# Patient Record
Sex: MALE | Race: OTHER | HISPANIC OR LATINO | Employment: OTHER | URBAN - METROPOLITAN AREA
[De-identification: names, ages, dates, MRNs, and addresses within clinical notes are randomized per-mention and may not be internally consistent; named-entity substitution may affect disease eponyms.]

---

## 2017-11-26 ENCOUNTER — HOSPITAL ENCOUNTER (EMERGENCY)
Facility: HOSPITAL | Age: 57
Discharge: HOME | End: 2017-11-26
Attending: EMERGENCY MEDICINE | Admitting: EMERGENCY MEDICINE
Payer: COMMERCIAL

## 2017-11-26 ENCOUNTER — APPOINTMENT (EMERGENCY)
Dept: RADIOLOGY | Facility: HOSPITAL | Age: 57
End: 2017-11-26
Payer: COMMERCIAL

## 2017-11-26 VITALS
RESPIRATION RATE: 14 BRPM | WEIGHT: 145 LBS | TEMPERATURE: 97.8 F | OXYGEN SATURATION: 98 % | HEART RATE: 58 BPM | DIASTOLIC BLOOD PRESSURE: 61 MMHG | SYSTOLIC BLOOD PRESSURE: 116 MMHG

## 2017-11-26 DIAGNOSIS — R07.9 CHEST PAIN: Primary | ICD-10-CM

## 2017-11-26 LAB
ANION GAP SERPL CALCULATED.3IONS-SCNC: 11 MMOL/L (ref 4–13)
BASOPHILS # BLD AUTO: 0.04 THOUSANDS/ΜL (ref 0–0.1)
BASOPHILS NFR BLD AUTO: 1 % (ref 0–1)
BUN SERPL-MCNC: 13 MG/DL (ref 5–25)
CALCIUM SERPL-MCNC: 8.8 MG/DL (ref 8.3–10.1)
CHLORIDE SERPL-SCNC: 98 MMOL/L (ref 100–108)
CO2 SERPL-SCNC: 25 MMOL/L (ref 21–32)
CREAT SERPL-MCNC: 0.99 MG/DL (ref 0.6–1.3)
EOSINOPHIL # BLD AUTO: 0.19 THOUSAND/ΜL (ref 0–0.61)
EOSINOPHIL NFR BLD AUTO: 4 % (ref 0–6)
ERYTHROCYTE [DISTWIDTH] IN BLOOD BY AUTOMATED COUNT: 12.9 % (ref 11.6–15.1)
GFR SERPL CREATININE-BSD FRML MDRD: 83 ML/MIN/1.73SQ M
GLUCOSE SERPL-MCNC: 104 MG/DL (ref 65–140)
HCT VFR BLD AUTO: 40.1 % (ref 36.5–49.3)
HGB BLD-MCNC: 14.1 G/DL (ref 12–17)
LYMPHOCYTES # BLD AUTO: 1.68 THOUSANDS/ΜL (ref 0.6–4.47)
LYMPHOCYTES NFR BLD AUTO: 32 % (ref 14–44)
MCH RBC QN AUTO: 30.7 PG (ref 26.8–34.3)
MCHC RBC AUTO-ENTMCNC: 35.2 G/DL (ref 31.4–37.4)
MCV RBC AUTO: 87 FL (ref 82–98)
MONOCYTES # BLD AUTO: 0.65 THOUSAND/ΜL (ref 0.17–1.22)
MONOCYTES NFR BLD AUTO: 12 % (ref 4–12)
NEUTROPHILS # BLD AUTO: 2.77 THOUSANDS/ΜL (ref 1.85–7.62)
NEUTS SEG NFR BLD AUTO: 51 % (ref 43–75)
NRBC BLD AUTO-RTO: 0 /100 WBCS
PLATELET # BLD AUTO: 258 THOUSANDS/UL (ref 149–390)
PMV BLD AUTO: 9 FL (ref 8.9–12.7)
POTASSIUM SERPL-SCNC: 3.8 MMOL/L (ref 3.5–5.3)
RBC # BLD AUTO: 4.6 MILLION/UL (ref 3.88–5.62)
SODIUM SERPL-SCNC: 134 MMOL/L (ref 136–145)
SPECIMEN SOURCE: NORMAL
TROPONIN I BLD-MCNC: 0.01 NG/ML (ref 0–0.08)
WBC # BLD AUTO: 5.33 THOUSAND/UL (ref 4.31–10.16)

## 2017-11-26 PROCEDURE — 36415 COLL VENOUS BLD VENIPUNCTURE: CPT | Performed by: EMERGENCY MEDICINE

## 2017-11-26 PROCEDURE — 80048 BASIC METABOLIC PNL TOTAL CA: CPT | Performed by: EMERGENCY MEDICINE

## 2017-11-26 PROCEDURE — 84484 ASSAY OF TROPONIN QUANT: CPT

## 2017-11-26 PROCEDURE — 93005 ELECTROCARDIOGRAM TRACING: CPT | Performed by: EMERGENCY MEDICINE

## 2017-11-26 PROCEDURE — 99285 EMERGENCY DEPT VISIT HI MDM: CPT

## 2017-11-26 PROCEDURE — 71020 HB CHEST X-RAY 2VW FRONTAL&LATL: CPT

## 2017-11-26 PROCEDURE — 85025 COMPLETE CBC W/AUTO DIFF WBC: CPT | Performed by: EMERGENCY MEDICINE

## 2017-11-26 RX ORDER — OMEPRAZOLE 20 MG/1
20 CAPSULE, DELAYED RELEASE ORAL DAILY
COMMUNITY

## 2017-11-27 LAB
ATRIAL RATE: 67 BPM
P AXIS: 42 DEGREES
PR INTERVAL: 124 MS
QRS AXIS: 74 DEGREES
QRSD INTERVAL: 114 MS
QT INTERVAL: 384 MS
QTC INTERVAL: 405 MS
T WAVE AXIS: 61 DEGREES
VENTRICULAR RATE: 67 BPM

## 2017-11-27 NOTE — DISCHARGE INSTRUCTIONS

## 2017-11-27 NOTE — ED PROVIDER NOTES
History  Chief Complaint   Patient presents with    Chest Pain     Pt reports chest pressure and b/l arm pain, reports ongoing but today got woprse, reports has been following up with PCP, and has stress test scheduled in 2 weeks    Arm Pain     Having intermittent CP for the past two months  PCP aware and has stress test ordered  Constant since last night  Has stress test in 2 weeks for this same complaint, but doesn't want to wait  Pt also notes he goes to the gym, but does not get any CP while there  History provided by:  Patient   used: No    Chest Pain   Pain location:  L chest and R chest  Pain radiates to:  L arm and R arm  Duration:  2 months  Timing:  Intermittent  Chronicity:  New  Associated symptoms: nausea    Associated symptoms: no abdominal pain, no back pain, no cough, no diaphoresis, no dizziness, no fever, no numbness, no palpitations, no shortness of breath, not vomiting and no weakness    Risk factors: no coronary artery disease, no diabetes mellitus, no high cholesterol, no hypertension and no smoking (Quit 30 years ago)    Arm Pain   Associated symptoms: chest pain and nausea    Associated symptoms: no abdominal pain, no cough, no fever, no shortness of breath and no vomiting        Prior to Admission Medications   Prescriptions Last Dose Informant Patient Reported? Taking?   omeprazole (PriLOSEC) 20 mg delayed release capsule   Yes Yes   Sig: Take 20 mg by mouth daily      Facility-Administered Medications: None       History reviewed  No pertinent past medical history  History reviewed  No pertinent surgical history  History reviewed  No pertinent family history  I have reviewed and agree with the history as documented  Social History   Substance Use Topics    Smoking status: Former Smoker    Smokeless tobacco: Never Used    Alcohol use No        Review of Systems   Constitutional: Negative for chills, diaphoresis and fever     Respiratory: Negative for cough, chest tightness and shortness of breath  Cardiovascular: Positive for chest pain  Negative for palpitations and leg swelling  Gastrointestinal: Positive for nausea  Negative for abdominal pain and vomiting  Musculoskeletal: Negative for back pain and neck pain  Skin: Negative for pallor  Neurological: Negative for dizziness, syncope, facial asymmetry, speech difficulty, weakness, light-headedness and numbness  All other systems reviewed and are negative  Physical Exam  ED Triage Vitals [11/26/17 1936]   Temperature Pulse Respirations Blood Pressure SpO2   97 8 °F (36 6 °C) 69 16 132/72 98 %      Temp Source Heart Rate Source Patient Position - Orthostatic VS BP Location FiO2 (%)   Temporal Monitor Sitting Right arm --      Pain Score       6           Orthostatic Vital Signs  Vitals:    11/26/17 1936 11/26/17 2100 11/26/17 2115   BP: 132/72  116/61   Pulse: 69 58    Patient Position - Orthostatic VS: Sitting         Physical Exam   Constitutional: He is oriented to person, place, and time  He appears well-developed and well-nourished  Non-toxic appearance  He does not have a sickly appearance  He does not appear ill  No distress  Laughing and joking around during H&P   HENT:   Head: Normocephalic and atraumatic  Mouth/Throat: Oropharynx is clear and moist    Eyes: EOM are normal  Pupils are equal, round, and reactive to light  Neck: Normal range of motion  Neck supple  No JVD present  Cardiovascular: Normal rate, regular rhythm, S1 normal, S2 normal, normal heart sounds, intact distal pulses and normal pulses  Exam reveals no gallop and no friction rub  No murmur heard  Pulmonary/Chest: Effort normal and breath sounds normal  No respiratory distress  He has no wheezes  He has no rales  He exhibits no tenderness  Abdominal: Soft  Bowel sounds are normal  He exhibits no distension  There is no tenderness  There is no rebound and no guarding     Neurological: He is alert and oriented to person, place, and time  He has normal strength  No cranial nerve deficit or sensory deficit  Skin: Skin is warm and dry  No rash noted  He is not diaphoretic  No pallor  Nursing note and vitals reviewed  ED Medications  Medications - No data to display    Diagnostic Studies  Results Reviewed     Procedure Component Value Units Date/Time    POCT troponin [[id number]]  (Normal) Collected:  11/26/17 2004    Lab Status:  Final result Updated:  11/26/17 2017     POC Troponin I 0 01 ng/ml      Specimen Type VENOUS    Narrative:         [employer] i-Stat handheld analyzer 99% cutoff is > 0 08ng/mL in [facility] Emergency Departments    o cTnI 99% cutoff is useful only when applied to patients in the clinical setting of myocardial ischemia  o cTnI 99% cutoff should be interpreted in the context of clinical history, ECG findings and possibly cardiac imaging to establish correct diagnosis  o cTnI 99% cutoff may be suggestive but clearly not indicative of a coronary event without the clinical setting of myocardial ischemia  Basic metabolic panel [[id number]]  (Abnormal) Collected:  11/26/17 1956    Lab Status:  Final result Specimen:  Blood from Arm, Right Updated:  11/26/17 2016     Sodium 134 (L) mmol/L      Potassium 3 8 mmol/L      Chloride 98 (L) mmol/L      CO2 25 mmol/L      Anion Gap 11 mmol/L      BUN 13 mg/dL      Creatinine 0 99 mg/dL      Glucose 104 mg/dL      Calcium 8 8 mg/dL      eGFR 83 ml/min/1 73sq m     Narrative:         National Kidney Disease Education Program recommendations are as follows:  GFR calculation is accurate only with a steady state creatinine  Chronic Kidney disease less than 60 ml/min/1 73 sq  meters  Kidney failure less than 15 ml/min/1 73 sq  meters      CBC and differential [[id number]]  (Normal) Collected:  11/26/17 1956    Lab Status:  Final result Specimen:  Blood from Arm, Right Updated:  11/26/17 2005     WBC 5 33 Thousand/uL      RBC 4 60 Million/uL Hemoglobin 14 1 g/dL      Hematocrit 40 1 %      MCV 87 fL      MCH 30 7 pg      MCHC 35 2 g/dL      RDW 12 9 %      MPV 9 0 fL      Platelets 294 Thousands/uL      nRBC 0 /100 WBCs      Neutrophils Relative 51 %      Lymphocytes Relative 32 %      Monocytes Relative 12 %      Eosinophils Relative 4 %      Basophils Relative 1 %      Neutrophils Absolute 2 77 Thousands/µL      Lymphocytes Absolute 1 68 Thousands/µL      Monocytes Absolute 0 65 Thousand/µL      Eosinophils Absolute 0 19 Thousand/µL      Basophils Absolute 0 04 Thousands/µL                  XR chest 2 views   ED Interpretation by Estephanie Amaya 24, DO (11/26 2005)   No acute abnormalities                 Procedures  ECG 12 Lead Documentation  Date/Time: 11/26/2017 7:53 PM  Performed by: Anthony Terry by: Frank Fang     Indications / Diagnosis:  Chest pain  ECG reviewed by me, the ED Provider: yes    Patient location:  Bedside  Rate:     ECG rate:  67    ECG rate assessment: normal    Rhythm:     Rhythm: sinus rhythm    Ectopy:     Ectopy: PAC    QRS:     QRS axis:  Normal    QRS intervals:  Normal  ST segments:     ST segments:  Normal  T waves:     T waves: normal             Phone Contacts  ED Phone Contact    ED Course  ED Course as of Nov 26 2120   Sun Nov 26, 2017 2044 Discussed results with pt  Instructed him to f/u with PCP and have stress test as scheduled  Pt agrees  MDM  Number of Diagnoses or Management Options  Chest pain:   Diagnosis management comments: Chest pain - Will check CBC to r/o anemia, BMP to r/o lyte abnormality, troponin to assess for NSTEMI, EKG to r/o STEMI/ischemic changes, CXR to r/o PTX/PNA/pulmonary edema/effusion         Amount and/or Complexity of Data Reviewed  Clinical lab tests: reviewed and ordered  Tests in the radiology section of CPT®: ordered and reviewed  Tests in the medicine section of CPT®: ordered and reviewed      CritCare Time    Disposition  Final diagnoses:   Chest pain     Time reflects when diagnosis was documented in both MDM as applicable and the Disposition within this note     Time User Action Codes Description Comment    11/26/2017  8:37 PM Jennie Green 48 [R07 9] Chest pain       ED Disposition     ED Disposition Condition Comment    Discharge  Charlette Merlin discharge to home/self care  Condition at discharge: Good        Follow-up Information     Follow up With Specialties Details Why [address]  Schedule an appointment as soon as possible for a visit For follow up [phone] [phone]          Discharge Medication List as of 11/26/2017  8:38 PM      CONTINUE these medications which have NOT CHANGED    Details   omeprazole (PriLOSEC) 20 mg delayed release capsule Take 20 mg by mouth daily, Historical Med           No discharge procedures on file      ED Provider  Electronically Signed by           Estephanie Amaya 24, DO  11/26/17 0745

## 2018-07-23 ENCOUNTER — HOSPITAL ENCOUNTER (EMERGENCY)
Facility: HOSPITAL | Age: 58
Discharge: HOME | End: 2018-07-24
Attending: EMERGENCY MEDICINE | Admitting: EMERGENCY MEDICINE
Payer: COMMERCIAL

## 2018-07-23 DIAGNOSIS — N50.812 PAIN IN LEFT TESTICLE: Primary | ICD-10-CM

## 2018-07-23 DIAGNOSIS — M54.10 RADICULOPATHY: ICD-10-CM

## 2018-07-23 LAB
BILIRUB UR QL STRIP: NEGATIVE
CLARITY UR: CLEAR
COLOR UR: YELLOW
GLUCOSE UR STRIP-MCNC: NEGATIVE MG/DL
HGB UR QL STRIP.AUTO: NEGATIVE
KETONES UR STRIP-MCNC: NEGATIVE MG/DL
LEUKOCYTE ESTERASE UR QL STRIP: NEGATIVE
NITRITE UR QL STRIP: NEGATIVE
PH UR STRIP.AUTO: 5.5 [PH] (ref 4.5–8)
PROT UR STRIP-MCNC: NEGATIVE MG/DL
SP GR UR STRIP.AUTO: 1.01 (ref 1–1.03)
UROBILINOGEN UR QL STRIP.AUTO: 0.2 E.U./DL

## 2018-07-23 PROCEDURE — 87491 CHLMYD TRACH DNA AMP PROBE: CPT | Performed by: PHYSICIAN ASSISTANT

## 2018-07-23 PROCEDURE — 81003 URINALYSIS AUTO W/O SCOPE: CPT | Performed by: PHYSICIAN ASSISTANT

## 2018-07-23 PROCEDURE — 87591 N.GONORRHOEAE DNA AMP PROB: CPT | Performed by: PHYSICIAN ASSISTANT

## 2018-07-24 ENCOUNTER — APPOINTMENT (EMERGENCY)
Dept: ULTRASOUND IMAGING | Facility: HOSPITAL | Age: 58
End: 2018-07-24
Payer: COMMERCIAL

## 2018-07-24 VITALS
RESPIRATION RATE: 16 BRPM | HEART RATE: 54 BPM | SYSTOLIC BLOOD PRESSURE: 135 MMHG | OXYGEN SATURATION: 99 % | DIASTOLIC BLOOD PRESSURE: 78 MMHG | TEMPERATURE: 98.1 F

## 2018-07-24 PROCEDURE — 76870 US EXAM SCROTUM: CPT

## 2018-07-24 PROCEDURE — 99284 EMERGENCY DEPT VISIT MOD MDM: CPT

## 2018-07-24 RX ORDER — PREDNISONE 20 MG/1
20 TABLET ORAL 3 TIMES DAILY
Qty: 12 TABLET | Refills: 0 | Status: SHIPPED | OUTPATIENT
Start: 2018-07-24 | End: 2018-07-28

## 2018-07-24 NOTE — ED PROVIDER NOTES
History  Chief Complaint   Patient presents with    Testicle Pain     left testicle pain, reports history of hernia, also has b/l buttock pain, pain radiates down left leg and radiates to left flank  60 yo M presenting with multiple complaints  Pt reports lower left sided back pain radiating down posterior left leg x 1 month  Pt reports pain with bending and twisting  He has not tried anything at home for his symptoms  Pt also complains of L groin pain and states his L testicle is swollen  Pt reports previous repair of L inguinal hernia and this pain 'feels the same'  Pt denies any dysuria, hematuria, purulent discharge from penis, rashes or lesions of the penis  He denies any abdominal pain, n/v/d  Prior to Admission Medications   Prescriptions Last Dose Informant Patient Reported? Taking?   omeprazole (PriLOSEC) 20 mg delayed release capsule   Yes Yes   Sig: Take 20 mg by mouth daily      Facility-Administered Medications: None       Past Medical History:   Diagnosis Date    Prediabetes        Past Surgical History:   Procedure Laterality Date    APPENDECTOMY         History reviewed  No pertinent family history  I have reviewed and agree with the history as documented  Social History   Substance Use Topics    Smoking status: Former Smoker    Smokeless tobacco: Never Used    Alcohol use No        Review of Systems   All other systems reviewed and are negative  Physical Exam  Physical Exam   Constitutional: He is oriented to person, place, and time  He appears well-developed and well-nourished  No distress  HENT:   Head: Normocephalic and atraumatic  Eyes: Conjunctivae are normal    EOM grossly intact   Neck: Normal range of motion  Neck supple  No JVD present  Cardiovascular: Normal rate  Pulmonary/Chest: Effort normal    Abdominal: Soft  Genitourinary: Penis normal  No penile tenderness     Genitourinary Comments: Tenderness to L testicle, slight erythema, no swelling, vertical lie, no discharge from the penis   Musculoskeletal:   FROM, steady gait, cap refill brisk, strength and sensation grossly intact throughout   Neurological: He is alert and oriented to person, place, and time  Skin: Skin is warm and dry  Capillary refill takes less than 2 seconds  Psychiatric: He has a normal mood and affect  His behavior is normal    Nursing note and vitals reviewed  Vital Signs  ED Triage Vitals [07/23/18 2242]   Temperature Pulse Respirations Blood Pressure SpO2   98 1 °F (36 7 °C) 62 16 169/85 100 %      Temp Source Heart Rate Source Patient Position - Orthostatic VS BP Location FiO2 (%)   Temporal Monitor Sitting Right arm --      Pain Score       8           Vitals:    07/23/18 2242 07/24/18 0108   BP: 169/85 135/78   Pulse: 62 (!) 54   Patient Position - Orthostatic VS: Sitting Lying       Visual Acuity      ED Medications  Medications - No data to display    Diagnostic Studies  Results Reviewed     Procedure Component Value Units Date/Time    Chlamydia/GC amplified DNA by PCR [[id number]] Collected:  07/23/18 2344    Lab Status: In process Specimen:  Urine from Urine, Other Updated:  07/23/18 2346    UA w Reflex to Microscopic w Reflex to Culture [[id number]] Collected:  07/23/18 2327    Lab Status:  Final result Specimen:  Urine from Urine, Other Updated:  07/23/18 2334     Color, UA Yellow     Clarity, UA Clear     Specific Gravity, UA 1 015     pH, UA 5 5     Leukocytes, UA Negative     Nitrite, UA Negative     Protein, UA Negative mg/dl      Glucose, UA Negative mg/dl      Ketones, UA Negative mg/dl      Urobilinogen, UA 0 2 E U /dl      Bilirubin, UA Negative     Blood, UA Negative                 US scrotum and testicles   Final Result by Coral Carson MD (07/24 0053)          1  Small bilateral hydroceles  2   No evidence of testicular torsion or mass  No evidence of epididymitis or orchitis        Workstation performed: [id number] Procedures  Procedures       Phone Contacts  ED Phone Contact    ED Course  ED Course as of Jul 24 0111   Mon Jul 23, 2018   2358 Pt requesting 'blood work for my liver and prostate', pt does not have any RUQ pain, long conversation with pt regarding he needs to f/u with pcp and urology for psa and routine blood work, pt stating he thinks his urologist is 'junk' and 'i think hes pyle', spoke with pt regarding we will get u/s at this time to r/o torsion but will need to f/u with pcp regarding liver enzymes and psa testing    Tue Jul 24, 2018   0008 Pt reporting 'ill just go to another hospital tomorrow for blood word'  Conversation with pt again about the importance of f/u with PCP    0009 Will await u/s scrotum    0102 Pt with hydrocele visualized on u/s, no evidence of torsion or epididymitis, will have another conversation with pt regarding the importance of f/u with pcp and urology for continued problem and d/c                                MDM  Number of Diagnoses or Management Options  Diagnosis management comments: Pt denies any RUQ pain, n/v/d, fevers chills or sweats  U/s showed no torsion or epidiymitis, will instruct pt to f/u with urology for continued testicular pain, pt continues to deny any dysuria, hematuria, urgency or frequency  Pt will need to f/u with pcp for routine bloodwork, long conversation with pt that we are here to r/o emergencies and do not run routine bloodwork as we do not have the f/u as his PCP will  Pt c/o posterior leg pain, likely radiculopathy, explained to pt will be starting on short course of steroids  All labs and imaging discussed with patient, strict return to ED precautions discussed  Pt verbalizes understanding and agrees with plan   Pt is stable for discharge      CritCare Time    Disposition  Final diagnoses:   Pain in left testicle   Radiculopathy     Time reflects when diagnosis was documented in both MDM as applicable and the Disposition within this note     Time User Action Codes Description Comment    7/24/2018  1:09 AM Ponce JIMÉNEZ Add [N50 812] Pain in left testicle     7/24/2018  1:10 AM Ponce JIMÉNEZ Add [M54 10] Radiculopathy       ED Disposition     ED Disposition Condition Comment    Discharge  Danielito Crutch discharge to home/self care  Condition at discharge: Good        Follow-up Information     Follow up With Specialties Details Why [facility], [address] [place] [address],[address] [facility]  [phone]      [facility]   [facility] [address]  [address] [place] [id number] [phone]          Patient's Medications   Discharge Prescriptions    PREDNISONE 20 MG TABLET    Take 1 tablet (20 mg total) by mouth 3 (three) times a day for 4 days       Start Date: 7/24/2018 End Date: 7/28/2018       Order Dose: 20 mg       Quantity: 12 tablet    Refills: 0     No discharge procedures on file      ED Provider  Electronically Signed by           Albert Hwang PA-C  07/24/18 0111

## 2018-07-24 NOTE — DISCHARGE INSTRUCTIONS
Testicle Pain   WHAT YOU NEED TO KNOW:   Testicle pain may start in your scrotum and spread to your abdomen  You may have sharp, sudden pain or dull pain that happens over time  Your testicle pain may come and go, or it may last for a long time  The cause of your pain may be unknown  Testicle pain can be caused by infection, trauma, hernia, kidney stones, or sexually transmitted infections (STIs)  You may have a painful lump in your scrotum  The lump may be caused by an enlarged vein or fluid that collects around one of your testicles  This lump also may be caused by a more serious medical condition  Part of your testicle may twist  This is a serious condition that needs treatment as soon as possible  DISCHARGE INSTRUCTIONS:   Medicines:   · Antibiotics: This medicine helps fight or prevent infection  Take your antibiotics until they are gone, even if you feel better  · Pain medicine: You may be given a prescription medicine to decrease pain  Do not wait until the pain is severe before you take this medicine  · NSAIDs:  These medicines decrease swelling, pain, and fever  NSAIDs are available without a doctor's order  Ask your healthcare provider which medicine is right for you  Ask how much to take and when to take it  Take as directed  NSAIDs can cause stomach bleeding and kidney problems if not taken correctly  · Take your medicine as directed  Contact your healthcare provider if you think your medicine is not helping or if you have side effects  Tell him or her if you are allergic to any medicine  Keep a list of the medicines, vitamins, and herbs you take  Include the amounts, and when and why you take them  Bring the list or the pill bottles to follow-up visits  Carry your medicine list with you in case of an emergency  Decrease discomfort:  With treatment, your pain may improve within 1 to 3 days   Depending on the cause of your testicle pain, your condition may take up to 4 weeks to heal   · Rest: Limit your activity until your pain decreases  Get more rest while you heal  Do not sit for long periods of time  · Cold packs:  Place cold packs on your testicles to help ease your pain  Use cold packs as directed  · Elevation:  Gently tuck a folded towel under your testicles to lift them as you sit in a chair or lie in bed  This will help ease your pain and decrease swelling  Follow up with your healthcare provider or urologist in 3 to 7 days:  Write down your questions so you remember to ask them during your visits  Sexual activity:  Avoid sexual activity until you have finished your antibiotics or until your healthcare provider tells you it is safe to have sex  Use condoms to lower your risk of STIs  Contact your healthcare provider or urologist if:   · You feel that your medicine or treatment is not working  · You feel more pain, tenderness, or swelling than before  · You have nausea or a low fever  · You have questions or concerns about your condition or care  Return to the emergency department if:   · You have sudden or severe pain in your testicles or abdomen  · You have pain in both testicles  · You are vomiting  · You have a high fever  · Your pain increases when you elevate your testicles  · Your scrotum turns blue  This could mean your testicle is not getting the blood flow it needs  © 2017 [address] Information is for End User's use only and may not be sold, redistributed or otherwise used for commercial purposes  All illustrations and images included in CareNotes® are the copyrighted property of A D A M [employer] Inc  or Brandt Shepard  The above information is an  only  It is not intended as medical advice for individual conditions or treatments  Talk to your doctor, nurse or pharmacist before following any medical regimen to see if it is safe and effective for you

## 2018-07-24 NOTE — ED NOTES
Pt transported to [address] [place] [address] [address]        Angus Johnson, RN  07/24/18 0914

## 2018-07-24 NOTE — ED NOTES
Pt states, "I came here to get blood work  The ultrasound is great and everything but I want my blood checked  Sometimes I have pain in my side so I need my liver and kidneys checked"  Marjan Monteiro made aware        Madi Echols RN  07/23/18 4284

## 2018-07-25 LAB
CHLAMYDIA DNA CVX QL NAA+PROBE: NORMAL
N GONORRHOEA DNA GENITAL QL NAA+PROBE: NORMAL

## 2020-01-22 ENCOUNTER — APPOINTMENT (EMERGENCY)
Dept: CT IMAGING | Facility: HOSPITAL | Age: 60
End: 2020-01-22
Payer: COMMERCIAL

## 2020-01-22 ENCOUNTER — HOSPITAL ENCOUNTER (EMERGENCY)
Facility: HOSPITAL | Age: 60
Discharge: HOME | End: 2020-01-23
Attending: EMERGENCY MEDICINE
Payer: COMMERCIAL

## 2020-01-22 DIAGNOSIS — R51.9 HEADACHE: Primary | ICD-10-CM

## 2020-01-22 PROCEDURE — 99284 EMERGENCY DEPT VISIT MOD MDM: CPT | Performed by: EMERGENCY MEDICINE

## 2020-01-22 PROCEDURE — 99284 EMERGENCY DEPT VISIT MOD MDM: CPT

## 2020-01-22 PROCEDURE — 70450 CT HEAD/BRAIN W/O DYE: CPT

## 2020-01-23 VITALS
HEART RATE: 61 BPM | TEMPERATURE: 97.7 F | WEIGHT: 150.13 LBS | OXYGEN SATURATION: 99 % | SYSTOLIC BLOOD PRESSURE: 148 MMHG | RESPIRATION RATE: 18 BRPM | DIASTOLIC BLOOD PRESSURE: 75 MMHG

## 2020-01-23 RX ORDER — IBUPROFEN 600 MG/1
600 TABLET ORAL ONCE
Status: COMPLETED | OUTPATIENT
Start: 2020-01-23 | End: 2020-01-23

## 2020-01-23 RX ORDER — ACETAMINOPHEN 325 MG/1
975 TABLET ORAL ONCE
Status: DISCONTINUED | OUTPATIENT
Start: 2020-01-23 | End: 2020-01-23 | Stop reason: HOSPADM

## 2020-01-23 RX ADMIN — IBUPROFEN 600 MG: 600 TABLET ORAL at 00:34

## 2020-01-23 NOTE — DISCHARGE INSTRUCTIONS
Acute Headache   DISCHARGE INSTRUCTIONS:   Return to the emergency department if:   You have severe pain  You have numbness or weakness on one side of your face or body  You have a headache that occurs after a blow to the head, a fall, or other trauma  You have a headache, are forgetful or confused, or have trouble speaking  You have a headache, stiff neck, and a fever  Contact your healthcare provider if:   You have a constant headache and are vomiting  You have a headache each day that does not get better, even after treatment  You have changes in your headaches, or new symptoms that occur when you have a headache  You have questions or concerns about your condition or care

## 2020-01-23 NOTE — ED PROVIDER NOTES
Emergency Department Note- Marianne Chi 62 y o  male MRN: [id number]    Unit/Bed#: ED 22 Encounter: [id number]        History of Present Illness     41-year-old male presents today for evaluation of a occipital headache  He says about 2 months ago he began having some pain in the back part of his head, which would somewhat wax and wane, but not really triggered by anything or made worse with anything and better with nothing  No history of trauma, no fever, no chills, no difficulty speaking, concentrating, or thinking  No difficulty moving the arms or legs  No one else sick with similar headaches, no recent head or neck infections or head or neck surgeries  He said he has not taken medication for the headache, and over the last several days the headache has gotten a little bit worse  He called his primary care physician earlier today, and was advised to go to the ED for further workup  REVIEW OF SYSTEMS     Constitutional:  No recent weight  gains or losses   Eyes:  No visual changes   ENT:  No tinnitus or hearing changes   Cardiac: No chest pain or palpitations   Respiratory:  No cough or shortness of breath   Abdominal:  No nausea or vomiting   Urinary: No dysuria or hematuria   Hematologic: No easy bruising or bleeding   Skin: No rash   Musculoskeletal: No aches or pains   Neurologic: As per HPI   Psychiatric: No mood changes      Historical Information   Past Medical History:   Diagnosis Date    Prediabetes      Past Surgical History:   Procedure Laterality Date    APPENDECTOMY       Social History   Social History     Substance and Sexual Activity   Alcohol Use Yes    Comment: social     Social History     Substance and Sexual Activity   Drug Use No     Social History     Tobacco Use   Smoking Status Former Smoker   Smokeless Tobacco Never Used     Family History: History reviewed  No pertinent family history  MEDICATIONS:  No current facility-administered medications on file prior to encounter  Current Outpatient Medications on File Prior to Encounter   Medication Sig Dispense Refill    omeprazole (PriLOSEC) 20 mg delayed release capsule Take 20 mg by mouth daily       ALLERGIES:  No Known Allergies    Vitals: Blood pressure 148/75, pulse 61, temperature 97 7 °F (36 5 °C), temperature source Temporal, resp  rate 18, weight 68 1 kg (150 lb 2 1 oz), SpO2 99 %  PHYSICAL EXAM    General:  Patient is well-appearing  Head:  Atraumatic, no redness or swelling  Eyes:  Conjunctiva pink, Extraocular muscle intact, PERRL, no temporal artery tenderness on either side  ENT:  Mucous membranes are moist  Neck:  Supple, no warmth or redness, no stiffness, no muscular hypertonicity  Cardiac:  S1-S2, without murmurs  Lungs:  Clear to auscultation bilaterally  Abdomen:  Soft, nontender, normal bowel sounds, no CVA tenderness, no tympany, no rigidity, no guarding  Extremities:  Normal range of motion  Neurologic:  Awake, fluent speech, normal comprehension  AAOx3  Cranial nerves 2-12 are intact, strength is 5/5 in the bilateral upper & lower extremities, no slurred speech, no facial droop, no deficit on finger-to-nose testing, no pronator drift  Sensation to light touch is equal and symmetric throughout the whole body  Skin:  Pink warm and dry, no rash  Psychiatric:  Alert, pleasant, cooperative            Labs Reviewed - No data to display    Medications   acetaminophen (TYLENOL) tablet 975 mg (975 mg Oral Not Given 1/23/20 0028)   ibuprofen (MOTRIN) tablet 600 mg (600 mg Oral Given 1/23/20 0034)       CT head wo contrast   Final Result      No acute intracranial abnormality  Workstation performed: [id number]             Vitals:    01/22/20 2255 01/23/20 0035   BP: 140/63 148/75   TempSrc: Temporal    Pulse: 67 61   Resp: 20 18   Patient Position - Orthostatic VS: Sitting Sitting   Temp: 97 7 °F (36 5 °C)             Assessment/Plan     ED Medical Decision Making:     On reassessment the patient was feeling better, no change the above findings  The cause the patient's symptoms is unclear but unlikely to represent an acute emergency  I do not believe that the patient has meningitis, subarachnoid hemorrhage, carbon monoxide exposure headache, or cavernous sinus thrombosis  Supportive care, importance of follow-up and return precautions were discussed with the patient  Patient expressed understanding  Time reflects when diagnosis was documented in both MDM as applicable and the Disposition within this note     Time User Action Codes Description Comment    1/23/2020 12:38 AM Falguni Marin Add [R51] Headache       ED Disposition     ED Disposition Condition Date/Time Comment    Discharge Stable u Jan 23, 2020 12:38 AM Derick  discharge to home/self care              Follow-up Information     Follow up With Specialties Details Why [id number] DO [facility] Schedule an appointment as soon as possible for a visit in 4 days  [address] [place] [address]  [phone]            Discharge Medication List as of 1/23/2020 12:38 AM               King Edelmira DO  01/23/20 0045

## 2020-01-23 NOTE — ED NOTES
Patient transported to [address] [place] [address]  , [facility]  01/22/20 5554

## 2024-06-05 ENCOUNTER — HOSPITAL ENCOUNTER (EMERGENCY)
Facility: HOSPITAL | Age: 64
Discharge: HOME | End: 2024-06-05
Attending: EMERGENCY MEDICINE
Payer: COMMERCIAL

## 2024-06-05 ENCOUNTER — APPOINTMENT (EMERGENCY)
Dept: RADIOLOGY | Facility: HOSPITAL | Age: 64
End: 2024-06-05
Payer: COMMERCIAL

## 2024-06-05 VITALS
TEMPERATURE: 98.9 F | HEART RATE: 88 BPM | WEIGHT: 148 LBS | DIASTOLIC BLOOD PRESSURE: 97 MMHG | SYSTOLIC BLOOD PRESSURE: 178 MMHG | RESPIRATION RATE: 22 BRPM | OXYGEN SATURATION: 98 %
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
## 2024-06-05 PROCEDURE — 99284 EMERGENCY DEPT VISIT MOD MDM: CPT | Performed by: EMERGENCY MEDICINE

## 2024-06-05 PROCEDURE — 73080 X-RAY EXAM OF ELBOW: CPT

## 2024-06-05 PROCEDURE — 99284 EMERGENCY DEPT VISIT MOD MDM: CPT

## 2024-06-05 PROCEDURE — 71046 X-RAY EXAM CHEST 2 VIEWS: CPT

## 2024-06-05 NOTE — ED NOTES
Awake, alert, O x 4.  Pt ambulated to room with steady gait.  Speech clear and appropriate.  HAYES WNL.  Resps easy and regular.  BBS CTA.  Reports pain at left elbow and left upper back.  No deformity, swelling, or visible injury.  Denies head strike, LOC, or neck pain.     Tim Henley RN  06/05/24 4080
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
## 2024-06-17 ENCOUNTER — HOSPITAL ENCOUNTER (OUTPATIENT)
Dept: RADIOLOGY | Facility: HOSPITAL | Age: 64
Discharge: HOME | End: 2024-06-17
Payer: COMMERCIAL

## 2024-06-17 DIAGNOSIS — T14.90XA TRAUMATIC DEFORMITY: ICD-10-CM

## 2024-06-17 PROCEDURE — 70250 X-RAY EXAM OF SKULL: CPT

## 2024-07-06 ENCOUNTER — HOSPITAL ENCOUNTER (OUTPATIENT)
Facility: MEDICAL CENTER | Age: 64
Discharge: HOME | End: 2024-07-06
Payer: COMMERCIAL

## 2024-07-06 DIAGNOSIS — T14.90XA INJURY: ICD-10-CM

## 2024-07-06 PROCEDURE — 72141 MRI NECK SPINE W/O DYE: CPT

## 2024-12-30 ENCOUNTER — APPOINTMENT (OUTPATIENT)
Dept: LAB | Facility: HOSPITAL | Age: 64
End: 2024-12-30
Payer: COMMERCIAL

## 2024-12-30 DIAGNOSIS — N40.1 ENLARGED PROSTATE WITH URINARY OBSTRUCTION: Primary | ICD-10-CM

## 2024-12-30 DIAGNOSIS — N13.8 ENLARGED PROSTATE WITH URINARY OBSTRUCTION: Primary | ICD-10-CM

## 2024-12-30 LAB
CHOLEST SERPL-MCNC: 150 MG/DL (ref ?–200)
HDLC SERPL-MCNC: 52 MG/DL
LDLC SERPL CALC-MCNC: 85 MG/DL (ref 0–100)
NONHDLC SERPL-MCNC: 98 MG/DL
PSA FREE MFR SERPL: 16.38 %
PSA FREE SERPL-MCNC: 0.09 NG/ML
PSA SERPL-MCNC: 0.57 NG/ML (ref 0–4)
TRIGL SERPL-MCNC: 63 MG/DL (ref ?–150)

## 2024-12-30 PROCEDURE — 80061 LIPID PANEL: CPT

## 2024-12-30 PROCEDURE — 36415 COLL VENOUS BLD VENIPUNCTURE: CPT

## 2024-12-30 PROCEDURE — 84153 ASSAY OF PSA TOTAL: CPT

## 2024-12-30 PROCEDURE — 84403 ASSAY OF TOTAL TESTOSTERONE: CPT

## 2024-12-30 PROCEDURE — 84154 ASSAY OF PSA FREE: CPT

## 2024-12-30 PROCEDURE — 84402 ASSAY OF FREE TESTOSTERONE: CPT

## 2025-01-03 LAB
TESTOST FREE SERPL-MCNC: 11 PG/ML (ref 6.6–18.1)
TESTOST SERPL-MCNC: 823 NG/DL (ref 264–916)

## 2025-03-26 ENCOUNTER — APPOINTMENT (OUTPATIENT)
Dept: LAB | Facility: HOSPITAL | Age: 65
End: 2025-03-26
Payer: COMMERCIAL

## 2025-03-26 DIAGNOSIS — E53.8 BIOTIN-(PROPIONYL-COA-CARBOXYLASE) LIGASE DEFICIENCY: ICD-10-CM

## 2025-03-26 DIAGNOSIS — R63.4 LOSS OF WEIGHT: ICD-10-CM

## 2025-03-26 LAB
ALBUMIN SERPL BCG-MCNC: 4.6 G/DL (ref 3.5–5)
ALP SERPL-CCNC: 76 U/L (ref 34–104)
ALT SERPL W P-5'-P-CCNC: 25 U/L (ref 7–52)
ANION GAP SERPL CALCULATED.3IONS-SCNC: 3 MMOL/L (ref 4–13)
AST SERPL W P-5'-P-CCNC: 22 U/L (ref 13–39)
BASOPHILS # BLD AUTO: 0.05 THOUSANDS/ÂΜL (ref 0–0.1)
BASOPHILS NFR BLD AUTO: 1 % (ref 0–1)
BILIRUB SERPL-MCNC: 0.9 MG/DL (ref 0.2–1)
BUN SERPL-MCNC: 9 MG/DL (ref 5–25)
CALCIUM SERPL-MCNC: 9.2 MG/DL (ref 8.4–10.2)
CHLORIDE SERPL-SCNC: 98 MMOL/L (ref 96–108)
CO2 SERPL-SCNC: 28 MMOL/L (ref 21–32)
CREAT SERPL-MCNC: 0.69 MG/DL (ref 0.6–1.3)
EOSINOPHIL # BLD AUTO: 0.16 THOUSAND/ÂΜL (ref 0–0.61)
EOSINOPHIL NFR BLD AUTO: 4 % (ref 0–6)
ERYTHROCYTE [DISTWIDTH] IN BLOOD BY AUTOMATED COUNT: 12.4 % (ref 11.6–15.1)
GFR SERPL CREATININE-BSD FRML MDRD: 98 ML/MIN/1.73SQ M
GLUCOSE P FAST SERPL-MCNC: 95 MG/DL (ref 65–99)
HCT VFR BLD AUTO: 41 % (ref 36.5–49.3)
HGB BLD-MCNC: 13.8 G/DL (ref 12–17)
IMM GRANULOCYTES # BLD AUTO: 0.01 THOUSAND/UL (ref 0–0.2)
IMM GRANULOCYTES NFR BLD AUTO: 0 % (ref 0–2)
LYMPHOCYTES # BLD AUTO: 1.52 THOUSANDS/ÂΜL (ref 0.6–4.47)
LYMPHOCYTES NFR BLD AUTO: 36 % (ref 14–44)
MCH RBC QN AUTO: 30.3 PG (ref 26.8–34.3)
MCHC RBC AUTO-ENTMCNC: 33.7 G/DL (ref 31.4–37.4)
MCV RBC AUTO: 90 FL (ref 82–98)
MONOCYTES # BLD AUTO: 0.47 THOUSAND/ÂΜL (ref 0.17–1.22)
MONOCYTES NFR BLD AUTO: 11 % (ref 4–12)
NEUTROPHILS # BLD AUTO: 2.02 THOUSANDS/ÂΜL (ref 1.85–7.62)
NEUTS SEG NFR BLD AUTO: 48 % (ref 43–75)
NRBC BLD AUTO-RTO: 0 /100 WBCS
PLATELET # BLD AUTO: 292 THOUSANDS/UL (ref 149–390)
PMV BLD AUTO: 8.8 FL (ref 8.9–12.7)
POTASSIUM SERPL-SCNC: 4.1 MMOL/L (ref 3.5–5.3)
PROT SERPL-MCNC: 7.6 G/DL (ref 6.4–8.4)
RBC # BLD AUTO: 4.55 MILLION/UL (ref 3.88–5.62)
SODIUM SERPL-SCNC: 129 MMOL/L (ref 135–147)
VIT B12 SERPL-MCNC: 213 PG/ML (ref 180–914)
WBC # BLD AUTO: 4.23 THOUSAND/UL (ref 4.31–10.16)

## 2025-03-26 PROCEDURE — 36415 COLL VENOUS BLD VENIPUNCTURE: CPT

## 2025-03-26 PROCEDURE — 85025 COMPLETE CBC W/AUTO DIFF WBC: CPT

## 2025-03-26 PROCEDURE — 80053 COMPREHEN METABOLIC PANEL: CPT

## 2025-03-26 PROCEDURE — 82607 VITAMIN B-12: CPT

## 2025-04-03 ENCOUNTER — OFFICE VISIT (OUTPATIENT)
Dept: NEUROLOGY | Facility: CLINIC | Age: 65
End: 2025-04-03
Payer: COMMERCIAL

## 2025-04-03 VITALS
TEMPERATURE: 98.2 F | HEIGHT: 66 IN | DIASTOLIC BLOOD PRESSURE: 76 MMHG | BODY MASS INDEX: 23.78 KG/M2 | WEIGHT: 148 LBS | OXYGEN SATURATION: 99 % | HEART RATE: 81 BPM | SYSTOLIC BLOOD PRESSURE: 134 MMHG

## 2025-04-03 DIAGNOSIS — G62.9 NEUROPATHY: ICD-10-CM

## 2025-04-03 DIAGNOSIS — M48.02 CERVICAL STENOSIS OF SPINAL CANAL: ICD-10-CM

## 2025-04-03 DIAGNOSIS — E53.8 VITAMIN B 12 DEFICIENCY: Primary | ICD-10-CM

## 2025-04-03 PROCEDURE — 96372 THER/PROPH/DIAG INJ SC/IM: CPT | Performed by: PSYCHIATRY & NEUROLOGY

## 2025-04-03 PROCEDURE — 99205 OFFICE O/P NEW HI 60 MIN: CPT | Performed by: PSYCHIATRY & NEUROLOGY

## 2025-04-03 RX ORDER — DOXAZOSIN 2 MG/1
2 TABLET ORAL
COMMUNITY

## 2025-04-03 RX ORDER — CYANOCOBALAMIN 1000 UG/ML
1000 INJECTION, SOLUTION INTRAMUSCULAR; SUBCUTANEOUS ONCE
Status: COMPLETED | OUTPATIENT
Start: 2025-04-03 | End: 2025-04-03

## 2025-04-03 RX ORDER — PANTOPRAZOLE SODIUM 40 MG/1
TABLET, DELAYED RELEASE ORAL
COMMUNITY

## 2025-04-03 RX ORDER — ALBUTEROL SULFATE 90 UG/1
INHALANT RESPIRATORY (INHALATION)
COMMUNITY
Start: 2025-02-12

## 2025-04-03 RX ORDER — FLUTICASONE PROPIONATE 50 MCG
SPRAY, SUSPENSION (ML) NASAL
COMMUNITY
Start: 2025-03-29

## 2025-04-03 RX ORDER — TRIAMCINOLONE ACETONIDE 1 MG/G
CREAM TOPICAL
COMMUNITY
Start: 2025-02-12

## 2025-04-03 RX ADMIN — CYANOCOBALAMIN 1000 MCG: 1000 INJECTION, SOLUTION INTRAMUSCULAR; SUBCUTANEOUS at 09:21
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
## 2025-04-17 ENCOUNTER — CONSULT (OUTPATIENT)
Dept: NEUROSURGERY | Facility: CLINIC | Age: 65
End: 2025-04-17
Payer: COMMERCIAL

## 2025-04-17 ENCOUNTER — APPOINTMENT (OUTPATIENT)
Dept: LAB | Facility: HOSPITAL | Age: 65
End: 2025-04-17
Payer: COMMERCIAL

## 2025-04-17 VITALS
HEART RATE: 79 BPM | SYSTOLIC BLOOD PRESSURE: 128 MMHG | BODY MASS INDEX: 23.67 KG/M2 | OXYGEN SATURATION: 98 % | TEMPERATURE: 97.4 F | WEIGHT: 147.3 LBS | DIASTOLIC BLOOD PRESSURE: 74 MMHG | RESPIRATION RATE: 16 BRPM | HEIGHT: 66 IN

## 2025-04-17 DIAGNOSIS — E87.1 HYPOSMOLALITY SYNDROME: ICD-10-CM

## 2025-04-17 DIAGNOSIS — M79.601 PAIN IN BOTH UPPER EXTREMITIES: ICD-10-CM

## 2025-04-17 DIAGNOSIS — M47.812 CERVICAL SPONDYLOSIS: Primary | ICD-10-CM

## 2025-04-17 DIAGNOSIS — M79.602 PAIN IN BOTH UPPER EXTREMITIES: ICD-10-CM

## 2025-04-17 DIAGNOSIS — M48.02 CERVICAL STENOSIS OF SPINAL CANAL: ICD-10-CM

## 2025-04-17 LAB
ANION GAP SERPL CALCULATED.3IONS-SCNC: 4 MMOL/L (ref 4–13)
BUN SERPL-MCNC: 12 MG/DL (ref 5–25)
CALCIUM SERPL-MCNC: 9.1 MG/DL (ref 8.4–10.2)
CHLORIDE SERPL-SCNC: 100 MMOL/L (ref 96–108)
CO2 SERPL-SCNC: 26 MMOL/L (ref 21–32)
CREAT SERPL-MCNC: 0.58 MG/DL (ref 0.6–1.3)
GFR SERPL CREATININE-BSD FRML MDRD: 106 ML/MIN/1.73SQ M
GLUCOSE P FAST SERPL-MCNC: 104 MG/DL (ref 65–99)
OSMOLALITY UR/SERPL-RTO: 281 MMOL/KG (ref 282–298)
POTASSIUM SERPL-SCNC: 4.2 MMOL/L (ref 3.5–5.3)
SODIUM SERPL-SCNC: 130 MMOL/L (ref 135–147)
TSH SERPL DL<=0.05 MIU/L-ACNC: 1.43 UIU/ML (ref 0.45–4.5)

## 2025-04-17 PROCEDURE — 83930 ASSAY OF BLOOD OSMOLALITY: CPT

## 2025-04-17 PROCEDURE — 80048 BASIC METABOLIC PNL TOTAL CA: CPT

## 2025-04-17 PROCEDURE — 99204 OFFICE O/P NEW MOD 45 MIN: CPT | Performed by: PHYSICIAN ASSISTANT

## 2025-04-17 PROCEDURE — 84443 ASSAY THYROID STIM HORMONE: CPT

## 2025-04-17 PROCEDURE — 36415 COLL VENOUS BLD VENIPUNCTURE: CPT

## 2025-04-17 RX ORDER — IBUPROFEN 800 MG/1
TABLET, FILM COATED ORAL
COMMUNITY
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
## 2025-05-07 ENCOUNTER — OFFICE VISIT (OUTPATIENT)
Dept: OBGYN CLINIC | Facility: MEDICAL CENTER | Age: 65
End: 2025-05-07
Payer: COMMERCIAL

## 2025-05-07 VITALS — BODY MASS INDEX: 23.3 KG/M2 | HEIGHT: 66 IN | WEIGHT: 145 LBS

## 2025-05-07 DIAGNOSIS — M77.12 LATERAL EPICONDYLITIS OF LEFT ELBOW: ICD-10-CM

## 2025-05-07 DIAGNOSIS — G89.29 CHRONIC ELBOW PAIN, LEFT: ICD-10-CM

## 2025-05-07 DIAGNOSIS — M75.82 TENDINITIS OF LEFT ROTATOR CUFF: ICD-10-CM

## 2025-05-07 DIAGNOSIS — M25.512 CHRONIC LEFT SHOULDER PAIN: Primary | ICD-10-CM

## 2025-05-07 DIAGNOSIS — G89.29 CHRONIC LEFT SHOULDER PAIN: Primary | ICD-10-CM

## 2025-05-07 DIAGNOSIS — M25.522 CHRONIC ELBOW PAIN, LEFT: ICD-10-CM
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
## 2025-05-07 PROCEDURE — 99204 OFFICE O/P NEW MOD 45 MIN: CPT | Performed by: EMERGENCY MEDICINE

## 2025-05-07 RX ORDER — LIDOCAINE HYDROCHLORIDE 20 MG/ML
SOLUTION OROPHARYNGEAL
COMMUNITY

## 2025-05-07 RX ORDER — METHYLPREDNISOLONE 4 MG/1
TABLET ORAL
Qty: 1 EACH | Refills: 0 | Status: SHIPPED | OUTPATIENT
Start: 2025-05-07
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
## 2025-05-09 PROBLEM — M25.522 CHRONIC ELBOW PAIN, LEFT: Status: ACTIVE | Noted: 2025-05-09

## 2025-05-09 PROBLEM — G89.29 CHRONIC LEFT SHOULDER PAIN: Status: ACTIVE | Noted: 2025-05-09

## 2025-05-09 PROBLEM — M75.82 TENDINITIS OF LEFT ROTATOR CUFF: Status: ACTIVE | Noted: 2025-05-09

## 2025-05-09 PROBLEM — M77.12 LATERAL EPICONDYLITIS OF LEFT ELBOW: Status: ACTIVE | Noted: 2025-05-09

## 2025-05-09 PROBLEM — M25.512 CHRONIC LEFT SHOULDER PAIN: Status: ACTIVE | Noted: 2025-05-09

## 2025-05-21 ENCOUNTER — HOSPITAL ENCOUNTER (OUTPATIENT)
Dept: MRI IMAGING | Facility: HOSPITAL | Age: 65
Discharge: HOME | End: 2025-05-21
Attending: EMERGENCY MEDICINE
Payer: COMMERCIAL
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
## 2025-05-21 DIAGNOSIS — M25.512 CHRONIC LEFT SHOULDER PAIN: ICD-10-CM

## 2025-05-21 DIAGNOSIS — M77.12 LATERAL EPICONDYLITIS OF LEFT ELBOW: ICD-10-CM

## 2025-05-21 DIAGNOSIS — G89.29 CHRONIC LEFT SHOULDER PAIN: ICD-10-CM

## 2025-05-21 DIAGNOSIS — M75.82 TENDINITIS OF LEFT ROTATOR CUFF: ICD-10-CM

## 2025-05-21 DIAGNOSIS — M25.522 CHRONIC ELBOW PAIN, LEFT: ICD-10-CM

## 2025-05-21 DIAGNOSIS — G89.29 CHRONIC ELBOW PAIN, LEFT: ICD-10-CM

## 2025-05-21 PROCEDURE — 73221 MRI JOINT UPR EXTREM W/O DYE: CPT

## 2025-05-28 ENCOUNTER — APPOINTMENT (EMERGENCY)
Dept: CT IMAGING | Facility: HOSPITAL | Age: 65
End: 2025-05-28
Payer: COMMERCIAL

## 2025-05-28 ENCOUNTER — OFFICE VISIT (OUTPATIENT)
Dept: OBGYN CLINIC | Facility: MEDICAL CENTER | Age: 65
End: 2025-05-28
Payer: COMMERCIAL

## 2025-05-28 ENCOUNTER — HOSPITAL ENCOUNTER (EMERGENCY)
Facility: HOSPITAL | Age: 65
Discharge: HOME | End: 2025-05-29
Attending: EMERGENCY MEDICINE | Admitting: EMERGENCY MEDICINE
Payer: COMMERCIAL

## 2025-05-28 ENCOUNTER — APPOINTMENT (EMERGENCY)
Dept: RADIOLOGY | Facility: HOSPITAL | Age: 65
End: 2025-05-28
Payer: COMMERCIAL

## 2025-05-28 VITALS — HEIGHT: 66 IN | BODY MASS INDEX: 23.78 KG/M2 | WEIGHT: 148 LBS

## 2025-05-28 VITALS
TEMPERATURE: 97.8 F | OXYGEN SATURATION: 100 % | HEART RATE: 61 BPM | DIASTOLIC BLOOD PRESSURE: 59 MMHG | SYSTOLIC BLOOD PRESSURE: 121 MMHG | RESPIRATION RATE: 18 BRPM

## 2025-05-28 DIAGNOSIS — G89.29 CHRONIC ELBOW PAIN, LEFT: ICD-10-CM

## 2025-05-28 DIAGNOSIS — G89.29 CHRONIC LEFT SHOULDER PAIN: Primary | ICD-10-CM

## 2025-05-28 DIAGNOSIS — M25.522 CHRONIC ELBOW PAIN, LEFT: ICD-10-CM

## 2025-05-28 DIAGNOSIS — M75.82 TENDINITIS OF LEFT ROTATOR CUFF: ICD-10-CM

## 2025-05-28 DIAGNOSIS — M25.512 CHRONIC LEFT SHOULDER PAIN: Primary | ICD-10-CM

## 2025-05-28 DIAGNOSIS — R00.1 SINUS BRADYCARDIA: ICD-10-CM

## 2025-05-28 DIAGNOSIS — M19.012 PRIMARY OSTEOARTHRITIS OF LEFT SHOULDER: ICD-10-CM

## 2025-05-28 DIAGNOSIS — R07.9 CHEST PAIN, UNSPECIFIED: Primary | ICD-10-CM

## 2025-05-28 DIAGNOSIS — S43.432D TEAR OF LEFT GLENOID LABRUM, SUBSEQUENT ENCOUNTER: ICD-10-CM
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
## 2025-05-28 DIAGNOSIS — E87.1 HYPONATREMIA: ICD-10-CM

## 2025-05-28 DIAGNOSIS — M77.12 LATERAL EPICONDYLITIS OF LEFT ELBOW: ICD-10-CM

## 2025-05-28 PROBLEM — S43.432A TEAR OF LEFT GLENOID LABRUM: Status: ACTIVE | Noted: 2025-05-28

## 2025-05-28 PROBLEM — S43.432A TEAR OF LEFT GLENOID LABRUM: Chronic | Status: ACTIVE | Noted: 2025-05-28

## 2025-05-28 LAB
2HR DELTA HS TROPONIN: 0 NG/L
ALBUMIN SERPL BCG-MCNC: 4.6 G/DL (ref 3.5–5)
ALP SERPL-CCNC: 104 U/L (ref 34–104)
ALT SERPL W P-5'-P-CCNC: 35 U/L (ref 7–52)
ANION GAP SERPL CALCULATED.3IONS-SCNC: 5 MMOL/L (ref 4–13)
AST SERPL W P-5'-P-CCNC: 25 U/L (ref 13–39)
BASOPHILS # BLD AUTO: 0.05 THOUSANDS/ÂΜL (ref 0–0.1)
BASOPHILS NFR BLD AUTO: 1 % (ref 0–1)
BILIRUB SERPL-MCNC: 0.47 MG/DL (ref 0.2–1)
BILIRUB UR QL STRIP: NEGATIVE
BUN SERPL-MCNC: 17 MG/DL (ref 5–25)
CALCIUM SERPL-MCNC: 9.3 MG/DL (ref 8.4–10.2)
CARDIAC TROPONIN I PNL SERPL HS: 5 NG/L (ref ?–50)
CARDIAC TROPONIN I PNL SERPL HS: 5 NG/L (ref ?–50)
CHLORIDE SERPL-SCNC: 97 MMOL/L (ref 96–108)
CLARITY UR: CLEAR
CO2 SERPL-SCNC: 27 MMOL/L (ref 21–32)
COLOR UR: COLORLESS
CREAT SERPL-MCNC: 0.84 MG/DL (ref 0.6–1.3)
EOSINOPHIL # BLD AUTO: 0.26 THOUSAND/ÂΜL (ref 0–0.61)
EOSINOPHIL NFR BLD AUTO: 4 % (ref 0–6)
ERYTHROCYTE [DISTWIDTH] IN BLOOD BY AUTOMATED COUNT: 12.4 % (ref 11.6–15.1)
GFR SERPL CREATININE-BSD FRML MDRD: 91 ML/MIN/1.73SQ M
GLUCOSE SERPL-MCNC: 118 MG/DL (ref 65–140)
GLUCOSE UR STRIP-MCNC: NEGATIVE MG/DL
HCT VFR BLD AUTO: 41.7 % (ref 36.5–49.3)
HGB BLD-MCNC: 13.7 G/DL (ref 12–17)
HGB UR QL STRIP.AUTO: NEGATIVE
IMM GRANULOCYTES # BLD AUTO: 0.01 THOUSAND/UL (ref 0–0.2)
IMM GRANULOCYTES NFR BLD AUTO: 0 % (ref 0–2)
KETONES UR STRIP-MCNC: NEGATIVE MG/DL
LEUKOCYTE ESTERASE UR QL STRIP: NEGATIVE
LIPASE SERPL-CCNC: 78 U/L (ref 11–82)
LYMPHOCYTES # BLD AUTO: 1.83 THOUSANDS/ÂΜL (ref 0.6–4.47)
LYMPHOCYTES NFR BLD AUTO: 30 % (ref 14–44)
MCH RBC QN AUTO: 29.8 PG (ref 26.8–34.3)
MCHC RBC AUTO-ENTMCNC: 32.9 G/DL (ref 31.4–37.4)
MCV RBC AUTO: 91 FL (ref 82–98)
MONOCYTES # BLD AUTO: 0.52 THOUSAND/ÂΜL (ref 0.17–1.22)
MONOCYTES NFR BLD AUTO: 9 % (ref 4–12)
NEUTROPHILS # BLD AUTO: 3.34 THOUSANDS/ÂΜL (ref 1.85–7.62)
NEUTS SEG NFR BLD AUTO: 56 % (ref 43–75)
NITRITE UR QL STRIP: NEGATIVE
NRBC BLD AUTO-RTO: 0 /100 WBCS
PH UR STRIP.AUTO: 6 [PH]
PLATELET # BLD AUTO: 269 THOUSANDS/UL (ref 149–390)
PMV BLD AUTO: 8.6 FL (ref 8.9–12.7)
POTASSIUM SERPL-SCNC: 3.9 MMOL/L (ref 3.5–5.3)
PROT SERPL-MCNC: 7.7 G/DL (ref 6.4–8.4)
PROT UR STRIP-MCNC: NEGATIVE MG/DL
RBC # BLD AUTO: 4.6 MILLION/UL (ref 3.88–5.62)
SODIUM SERPL-SCNC: 129 MMOL/L (ref 135–147)
SP GR UR STRIP.AUTO: 1.01 (ref 1–1.03)
TSH SERPL DL<=0.05 MIU/L-ACNC: 2.45 UIU/ML (ref 0.45–4.5)
UROBILINOGEN UR STRIP-ACNC: <2 MG/DL
WBC # BLD AUTO: 6.01 THOUSAND/UL (ref 4.31–10.16)

## 2025-05-28 PROCEDURE — 99285 EMERGENCY DEPT VISIT HI MDM: CPT

## 2025-05-28 PROCEDURE — 93005 ELECTROCARDIOGRAM TRACING: CPT

## 2025-05-28 PROCEDURE — 83690 ASSAY OF LIPASE: CPT

## 2025-05-28 PROCEDURE — 99213 OFFICE O/P EST LOW 20 MIN: CPT | Performed by: EMERGENCY MEDICINE

## 2025-05-28 PROCEDURE — 81003 URINALYSIS AUTO W/O SCOPE: CPT

## 2025-05-28 PROCEDURE — 84484 ASSAY OF TROPONIN QUANT: CPT

## 2025-05-28 PROCEDURE — 84443 ASSAY THYROID STIM HORMONE: CPT

## 2025-05-28 PROCEDURE — 36415 COLL VENOUS BLD VENIPUNCTURE: CPT

## 2025-05-28 PROCEDURE — 71046 X-RAY EXAM CHEST 2 VIEWS: CPT

## 2025-05-28 PROCEDURE — 85025 COMPLETE CBC W/AUTO DIFF WBC: CPT

## 2025-05-28 PROCEDURE — 96360 HYDRATION IV INFUSION INIT: CPT

## 2025-05-28 PROCEDURE — 80053 COMPREHEN METABOLIC PANEL: CPT

## 2025-05-28 PROCEDURE — 70450 CT HEAD/BRAIN W/O DYE: CPT

## 2025-05-28 RX ADMIN — SODIUM CHLORIDE 500 ML: 0.9 INJECTION, SOLUTION INTRAVENOUS at 20:36

## 2025-05-28 NOTE — PATIENT INSTRUCTIONS
You may use Advil (ibuprofen) 400-600mg every 6 hours or at least twice per day (OR Aleve (naproxen) 250-500mg every 12 hours as needed for pain and inflammation).  However do not mix or take other NSAIDs together such as Advil, Motrin, ibuprofen, Celebrex, naproxen, diclofenac or Aleve.    You may also take Tylenol (acetaminophen) together with Advil (ibuprofen) or Aleve (naproxen) as this is safe and can help decrease your pain levels.  The dosing for Tylenol is 500mg every 4-6 hours as needed OR max 1,000mg per dose up to 3 times per day for a total of 3,000mg per day  *Check with your primary care physician to see if these medications are safe to take and to make sure they do not interfere with your other medications and medical issues.        Patient Education     Ejercicios de fortalecimiento para tendinitis del manguito rotador   Acerca de gloria hien   El manguito rotador es un efraín de músculos y tendones que se encuentran en el hombro. Ayuda a que el hombro se mueva y esté estable. Estos músculos ayudan a rotar y elevar el brazo. Un tendón es raz dewitt sathish de tejido que conecta los músculos a los huesos. La tendinitis ocurre cuando el tendón se inflama e hincha. Gloria problema puede mejorar con ejercicios. Si tiene gloria problema, puede ser útil consultar a un fisioterapeuta. El terapeuta le puede decir cuáles son los mejores ejercicios para usted.  General   Antes de comenzar con un programa, consulte a shepard médico para saber si está lo suficientemente tim norma para hacer estos ejercicios. Es posible que el médico le pida que trabaje con un entrenador o fisioterapeuta para elaborar un programa seguro de actividad física que cumpla con janell necesidades.  Ejercicios de fortalecimiento   Los ejercicios de fortalecimiento mantienen los músculos firmes y emilee. Comience repitiendo cada ejercicio 2 o 3 veces. Progrese hasta hacer cada ejercicio 10 veces. Trate de hacer estos ejercicios 2 o 3 veces al día.  "Mantenga cada ejercicio lorrie 3 a 5 segundos. Esther todos los ejercicios de forma lenta.  Ejercicios para omóplatos: recuéstese boca abajo cerca del borde de raz alfombra, cama o sobre raz pelota para ejercicios. Comience con los brazos colgando en raz posición relajada.  Posición en Y: Eleve los brazos hacia arriba y hacia los lados de modo que los codos estén cerca de las orejas y los brazos rectos en diagonal norma la letra Y. Baje los brazos hasta la posición inicial.  Posición en T: Eleve los brazos en forma recta hacia los lados, a la altura de los hombros. Baje los brazos hasta la posición inicial.  Posición en l: Levante los brazos hacia atrás hasta que estén alineados con el cuerpo norma la letra I. Baje los brazos lentamente para colocarlos en la posición inicial.  Flexiones contra raz pared: párese a unas 18 pulgadas (45 cm) de distancia de raz pared. Coloque las tati sobre la pared frente a ambos hombros. Con la espalda recta, inclínese hacia adelante flexionando los codos hasta que shepard jeramy berny toque la pared. Ahora, enderece los codos y empuje el cuerpo alejándolo de la pared. A medida que se torne más fácil, intente hacer flexiones recargándose en un mostrador para apoyar más peso en los brazos.  Ejercicios para hombros: ate un nudo en raz dewitt elástica. Asegure la dewitt a la altura de la cintura en raz larisa y ciérrela. Envuelva la dewitt alrededor de raz mano para tener un buen agarre. Aléjese de la larisa los suficiente norma para tener raz leve tensión en la dewitt. A medida que el ejercicio se hace más fácil, puede cambiar a raz dewitt más resistente. Acercarse o alejarse del punto en el que está atada la dewitt disminuirá o aumentará la tensión en la dewitt.  Rotaciones internas: colóquese de lado con el brazo que sostiene la dewitt más cerca de la larisa. Doble el codo a 90 grados o en forma de \"L\". Manteniendo el codo flexionado a shepard lado, tire de la dewitt sobre el cuerpo. Llévela nuevamente a la " "posición inicial. Repita con el otro brazo.  Rotaciones externas: colóquese de lado con el brazo que sostiene la dewitt más alejada de la larisa. Doble el codo a 90 grados o en forma de \"L\". Manteniendo el codo flexionado a shepard lado, tire de la dewitt lejos del cuerpo. Llévela nuevamente a la posición inicial. Repita con el otro brazo.  Abducciones: colóquese de lado con el brazo que sostiene la dewitt más alejado de la larisa. Asegúrese de que el pulgar ashley hacia arriba. Con el codo recto, tire de la dewitt hacia afuera y aléjela del cuerpo. Llegue hasta el nivel del hombro. Llévela nuevamente a la posición inicial. Repita con el otro brazo.  Flexiones: colóquese de espaldas a la larisa. Con el codo recto, tire de la dewitt en forma recta frente a usted. Llévela nuevamente a la posición inicial. Repita con el otro brazo.         ¿Cuáles serán los resultados?   Menos dolor y rigidez  Mejor amplitud de movimiento  Mejor funcionamiento  Menos inflamación  Mayor fuerza  Resulta más fácil hacer las actividades diarias  Consejos útiles   Evite las actividades que repetidamente utilizan el hombro de la misma manera, lo que podría hacer que shepard dolor de hombro empeorara.  Manténgase activa y realice ejercicios para mantener los músculos emilee y flexibles.  Siga raz dieta saludable para mantener janell músculos sanos.  No contenga la respiración cuando realice actividad física. [place] aumentará la presión arterial. Si usted suele hacer esto, pruebe contar en voz karlie mientras hace ejercicio. Si algún ejercicio le general malestar, deje de hacerlo inmediatamente.  Intente caminar o balancear los brazos a un ritmo lento lorrie algunos minutos para calentar los músculos. Esther esto luego de ejercitar.  Después de hacer ejercicio, es buena idea usar hielo. Coloque raz compresa de hielo o raz bolsa de guisantes congelados envueltos en raz toalla sobre la parte del cuerpo que le duela. Nunca coloque el hielo directamente sobre la piel. No " deje el hielo más de 10 a 15 minutos por vez. Colocar hielo después de la actividad física puede disminuir el dolor y la inflamación. Nunca coloque hielo antes de elongar.  Ejercitarse antes de cada comida es raz buena manera de mantener raz rutina.  Es posible que se sienta algo incómodo cuando liliana ejercicio, lory no debería sentir un dolor intenso. Si siente un dolor intenso, deje lo que está haciendo. Si el dolor continúa, llame al médico.  Exención de responsabilidad y uso de la información del consumidor   Esta información general es un resumen limitado de la información sobre el diagnóstico, el tratamiento y/o la medicación. No pretende ser exhaustivo y debe utilizarse norma raz herramienta para ayudar al usuario a comprender y/o evaluar las posibles opciones de diagnóstico y tratamiento. NO incluye toda la información sobre las enfermedades, los tratamientos, los medicamentos, los efectos secundarios o los riesgos que pueden aplicarse a un paciente específico. No tiene por objeto ser un consejo médico ni un sustituto del consejo médico. [place] pretende reemplazar al diagnóstico o el tratamiento proporcionados por un proveedor de atención médica con base en el examen y la evaluación por parte de gloria proveedor de las circunstancias específicas y únicas de un paciente. Los pacientes deben hablar con un proveedor de atención médica para obtener información completa sobre shepard ronit, preguntas médicas y opciones de tratamiento, incluidos los riesgos o beneficios relacionados con el uso de medicamentos. Esta información no respalda ningún tratamiento o medicamento norma seguro, eficaz o aprobado para tratar a un paciente específico. [employer] y janell afiliados renuncian a cualquier garantía o responsabilidad relacionada con esta información o con el uso que se liliana de esta. El uso de esta información se rige por las Condiciones de uso, disponibles en  https://www.wolterskluwer.com/en/know/clinical-effectiveness-terms   Copyright   Copyright © 2024 [employer] y janell licenciantes y/o afiliados. Todos los derechos reservados.      Patient Education     Ejercicios para epicondilitis lateral   Acerca de gloria hien   El codo es donde el hueso del brazo se une a los dos huesos del antebrazo. Hay raz protuberancia por fuera del codo, donde termina el hueso de la parte superior del brazo. Es el epicóndilo externo o lateral. Algunos tendones se adhieren aquí. Los tendones unen los músculos al hueso. Estos músculos sirven para tirar hacia arriba la helen y los dedos.   Cuando estos tendones se irritan y se hinchan por uso excesivo, usted tiene epicondilitis lateral. También se llama codo del tenista. Es un problema frecuente en los jugadores de tenis. Puede ocurrir también con otras actividades o deportes. El riesgo de tener gloria problema es mayor en el brazo que más se usa, lory puede suceder en cualquiera de los brazos. El ejercicio puede ayudarlo a mejorar gloria problema.  General   Antes de comenzar con un programa, consulte al médico para saber si está lo suficientemente tim norma para hacer estos ejercicios. Es posible que el médico le pida que trabaje con un entrenador o fisioterapeuta para elaborar un programa seguro de actividad física que cumpla con janell necesidades.  Ejercicios de estiramiento   Los ejercicios de estiramiento mantienen los músculos flexibles. También evitan que se endurezcan. Para comenzar, realice estos estiramientos 2 o 3 veces. Para que shepard cuerpo produzca cambios, deberá sostener estos estiramientos lorrie 20 a 30 segundos. Realice estos estiramientos 2 o 3 veces al día. Liliana todos los ejercicios de forma lenta.  Estiramientos de helen con flexión hacia abajo: enderece el codo y liliana que la mayer ashley hacia abajo. Con el codo adolorido recto, flexione la mano y los dedos hacia abajo de modo que los dedos apunten al suelo. [place] la mano con  la otra mano y tire de la helen hacia atrás un poco más hasta sentir la elongación.  Ejercicios de fortalecimiento   Los ejercicios de fortalecimiento mantienen janell músculos firmes y emilee. Comience repitiendo cada ejercicio 2 o 3 veces. Progrese hasta hacer cada ejercicio 10 veces. Trate de hacer estos ejercicios 2 o 3 veces al día. Esther todos los ejercicios de forma lenta.  Algunos ejercicios se pueden realizar sosteniendo raz pesa liviana. Puede usar raz domenica de sopa o raz mancuerna. Si el ejercicio se vuelve demasiado fácil, agregue más peso o repita el ejercicio más veces.  Ejercicios para helen sentado con el antebrazo apoyado sobre raz magallon o la pierna. La mano debe estar lejos del borde:  Flexiones hacia arriba: coloque las bruna hacia ARRIBA con raz mancuerna pequeña en la mano. Flexione la helen hacia arriba todo lo que pueda. Ahora, baje la mancuerna nuevamente. Asegúrese de controlar la mancuerna mientras la baja. Repita con la otra mano.  Flexiones hacia abajo: coloque las bruna hacia ABAJO con raz mancuerna pequeña en la mano. Flexione la helen hacia atrás todo lo que pueda. Ahora, baje la mancuerna nuevamente. Asegúrese de controlar la mancuerna mientras la baja. Repita con la otra mano.  Lado a lado: coloque la mano DE LADO con el pulgar hacia arriba. Sostenga raz mancuerna y eleve la mano. Ahora, baje la mano nuevamente. Repita con la otra mano.  Giros con el antebrazo con peso: comience con el codo flexionado y el brazo a shepard lado. Sostenga raz mancuerna pequeña con la mano. Con el brazo al costado y sin  el codo, gire el antebrazo de modo que la mayer quede hacia abajo. Ahora, gire el antebrazo hasta que la mayer quede hacia arriba. Repita con la otra mano.  Separación de los dedos con dewitt de goma: busque raz dewitt elástica gruesa. Enderece los dedos y únalos de modo que se toquen unos con otros. Coloque la dewitt elástica alrededor de los dedos y el pulgar lo más cerca de las uñas  posible. Separe los dedos hacia afuera todo lo que pueda. Luego, llévelos lentamente a la posición inicial.  Ejercicios con pelota: suavemente apriete raz pelota de tenis unas 10 veces. Si no siente dolor, apriete con más fuerza.  Golpes de tenis con dewitt elástica para ejercicios: raz vez que el dolor haya disminuido y esté berny listo para volver a la cancha de tenis, intente hacer estos 3 ejercicios. Deberá hacerlos cerca de raz larisa o armario que se pueda abrir y cerrar fácilmente. Comience con raz dewitt más delgada y progrese a raz más gruesa. Estos ejercicios con dewitt pueden ayudarlo con rani golpes de tenis:  Derecha: asegure raz dewitt elástica en raz larisa a la altura de la cintura. Párese de costado con la mano que utiliza más cerca de la larisa. [place] la dewitt con gilmar mano. Tire la dewitt sobre shepard cuerpo al igual que en un movimiento de derecha.  Revés: asegure raz dewitt elástica en raz larisa a la altura de la cintura. Párese de costado con la mano que utiliza más alejada de la larisa. Estírese hacia la larisa y tome la dewitt con gilmar mano. Ahora, jale la dewitt sobre shepard cuerpo al igual que en un movimiento de revés.  Saque: asegure raz dewitt elástica en raz larisa a la altura del hombro. Colóquese de espaldas a la larisa. [place] la dewitt con la mano que más utiliza. Comience con la mano arriba y detrás de la jessi al igual que cuando se prepara para sacar. Tire la dewitt hacia arriba y por encima norma si estuviera haciendo un movimiento de saque.  Shepard médico o terapeuta también puede indicarle que use raz wally de goma o raz wally flexible para hacer los ejercicios y mejorar la epicondilitis lateral.               ¿Cuáles serán los resultados?   Menos dolor e hinchazón  Mayor fuerza  Mejor amplitud de movimiento  Mayor facilidad para realizar actividades con los brazos  Consejos útiles   Manténgase activa y realice ejercicios para mantener los músculos emilee y flexibles.  Mantenga un peso saludable y evite  someter las articulaciones a demasiado esfuerzo. Siga raz dieta saludable para mantener janell músculos sanos.  No contenga la respiración cuando realice actividad física. [place] aumentará la presión arterial. Si usted suele hacer esto, pruebe contar en voz karlie mientras hace ejercicio. Si algún ejercicio le general malestar, deje de hacerlo inmediatamente.  Siempre esther un precalentamiento antes de elongar. Los músculos calientes se estiran más fácil que los fríos. Estirar músculos fríos puede causar lesiones.  Intente caminar o balancear los brazos a un ritmo lento lorrie algunos minutos para calentar los músculos. Esther esto luego de ejercitar.  No rebote cuando elongue.  Ejercitarse antes de cada comida es raz buena manera de mantener raz rutina.  Si usa pesas, elija un peso que le permitirá repetir el ejercicio 10 veces antes del descansar. Si hace 10 repeticiones con facilidad, ariadna vez deba aumentar el peso. Si no logra hacer 10 repeticiones, es demasiado peso.  Es posible que se sienta algo incómodo cuando esther ejercicio, lory no debería sentir un dolor intenso. Si siente un dolor intenso, deje lo que está haciendo. Si el dolor continúa, llame al médico.  ¿Dónde puedo obtener más información?   American Academy [employer]  http://www.aafp.org/afp/2007/0915/p849.html   Exención de responsabilidad y uso de la información del consumidor   Esta información general es un resumen limitado de la información sobre el diagnóstico, el tratamiento y/o la medicación. No pretende ser exhaustivo y debe utilizarse norma raz herramienta para ayudar al usuario a comprender y/o evaluar las posibles opciones de diagnóstico y tratamiento. NO incluye toda la información sobre las enfermedades, los tratamientos, los medicamentos, los efectos secundarios o los riesgos que pueden aplicarse a un paciente específico. No tiene por objeto ser un consejo médico ni un sustituto del consejo médico. [place] pretende reemplazar al  diagnóstico o el tratamiento proporcionados por un proveedor de atención médica con base en el examen y la evaluación por parte de gloria proveedor de las circunstancias específicas y únicas de un paciente. Los pacientes deben hablar con un proveedor de atención médica para obtener información completa sobre shepard ronit, preguntas médicas y opciones de tratamiento, incluidos los riesgos o beneficios relacionados con el uso de medicamentos. Esta información no respalda ningún tratamiento o medicamento norma seguro, eficaz o aprobado para tratar a un paciente específico. [employer] y janell afiliados renuncian a cualquier garantía o responsabilidad relacionada con esta información o con el uso que se liliana de esta. El uso de esta información se rige por las Condiciones de uso, disponibles en https://www.[employer] Medical Systemsuwer.com/en/know/clinical-effectiveness-terms   Copyright   Copyright © 2024 [employer] y janell licenciantes y/o afiliados. Todos los derechos reservados.
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
## 2025-05-28 NOTE — ASSESSMENT & PLAN NOTE
Jose experienced H/A and N/T with Medrol pack  Reviewed MRI Left shoulder and elbow  Discussed CSI left shoulder however patient is hesitant given side effects from oral steroids.  He may return for steroid injection of the left shoulder when he is comfortable with this decision.  Will also refer to physical therapy  To consider fluoroscopic guided CSI GHJ for labral tear and cyst  Orders:    Ambulatory Referral to Physical Therapy; Future

## 2025-05-28 NOTE — ASSESSMENT & PLAN NOTE
Symptoms consistent with lateral epicondylitis, reviewed MRI of the left elbow  Recommend formal directed physical therapy, offered tennis elbow strap  Discussed risks and benefits of CSI  Orders:    Ambulatory Referral to Physical Therapy; Future    Tennis elbow strap

## 2025-05-29 LAB
ATRIAL RATE: 51 BPM
ATRIAL RATE: 57 BPM
P AXIS: 61 DEGREES
P AXIS: 71 DEGREES
PR INTERVAL: 140 MS
PR INTERVAL: 146 MS
QRS AXIS: 60 DEGREES
QRS AXIS: 73 DEGREES
QRSD INTERVAL: 114 MS
QRSD INTERVAL: 124 MS
QT INTERVAL: 428 MS
QT INTERVAL: 444 MS
QTC INTERVAL: 409 MS
QTC INTERVAL: 416 MS
T WAVE AXIS: 68 DEGREES
T WAVE AXIS: 73 DEGREES
VENTRICULAR RATE: 51 BPM
VENTRICULAR RATE: 57 BPM

## 2025-05-29 PROCEDURE — 93010 ELECTROCARDIOGRAM REPORT: CPT

## 2025-05-29 RX ORDER — ACETAMINOPHEN 325 MG/1
975 TABLET ORAL ONCE
Status: COMPLETED | OUTPATIENT
Start: 2025-05-29 | End: 2025-05-29

## 2025-05-29 RX ADMIN — ACETAMINOPHEN 975 MG: 325 TABLET, FILM COATED ORAL at 00:19
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
## 2025-05-29 NOTE — DISCHARGE INSTRUCTIONS
Return for chest pain, trouble breathing, leg swelling, coughing up blood, leg swelling, numbness, weakness, or any other new/concerning symptoms     Follow up with Cardiology and your PCP
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
## 2025-07-04 ENCOUNTER — APPOINTMENT (EMERGENCY)
Dept: CT IMAGING | Facility: HOSPITAL | Age: 65
End: 2025-07-04
Payer: COMMERCIAL

## 2025-07-04 ENCOUNTER — APPOINTMENT (EMERGENCY)
Dept: RADIOLOGY | Facility: HOSPITAL | Age: 65
End: 2025-07-04
Payer: COMMERCIAL

## 2025-07-04 ENCOUNTER — HOSPITAL ENCOUNTER (EMERGENCY)
Facility: HOSPITAL | Age: 65
Discharge: HOME | End: 2025-07-04
Attending: EMERGENCY MEDICINE
Payer: COMMERCIAL

## 2025-07-04 VITALS
WEIGHT: 147.05 LBS | TEMPERATURE: 97.7 F | BODY MASS INDEX: 23.73 KG/M2 | DIASTOLIC BLOOD PRESSURE: 73 MMHG | OXYGEN SATURATION: 99 % | SYSTOLIC BLOOD PRESSURE: 154 MMHG | HEART RATE: 47 BPM | RESPIRATION RATE: 16 BRPM

## 2025-07-04 DIAGNOSIS — R07.81 RIB PAIN ON RIGHT SIDE: Primary | ICD-10-CM

## 2025-07-04 LAB
ALBUMIN SERPL BCG-MCNC: 4.4 G/DL (ref 3.5–5)
ALP SERPL-CCNC: 76 U/L (ref 34–104)
ALT SERPL W P-5'-P-CCNC: 25 U/L (ref 7–52)
ANION GAP SERPL CALCULATED.3IONS-SCNC: 3 MMOL/L (ref 4–13)
AST SERPL W P-5'-P-CCNC: 19 U/L (ref 13–39)
BASOPHILS # BLD AUTO: 0.04 THOUSANDS/ÂΜL (ref 0–0.1)
BASOPHILS NFR BLD AUTO: 1 % (ref 0–1)
BILIRUB SERPL-MCNC: 0.62 MG/DL (ref 0.2–1)
BILIRUB UR QL STRIP: NEGATIVE
BUN SERPL-MCNC: 11 MG/DL (ref 5–25)
CALCIUM SERPL-MCNC: 9 MG/DL (ref 8.4–10.2)
CARDIAC TROPONIN I PNL SERPL HS: 4 NG/L (ref ?–50)
CHLORIDE SERPL-SCNC: 100 MMOL/L (ref 96–108)
CLARITY UR: CLEAR
CO2 SERPL-SCNC: 28 MMOL/L (ref 21–32)
COLOR UR: YELLOW
CREAT SERPL-MCNC: 0.74 MG/DL (ref 0.6–1.3)
EOSINOPHIL # BLD AUTO: 0.21 THOUSAND/ÂΜL (ref 0–0.61)
EOSINOPHIL NFR BLD AUTO: 4 % (ref 0–6)
ERYTHROCYTE [DISTWIDTH] IN BLOOD BY AUTOMATED COUNT: 12.3 % (ref 11.6–15.1)
GFR SERPL CREATININE-BSD FRML MDRD: 96 ML/MIN/1.73SQ M
GLUCOSE SERPL-MCNC: 92 MG/DL (ref 65–140)
GLUCOSE UR STRIP-MCNC: NEGATIVE MG/DL
HCT VFR BLD AUTO: 39.3 % (ref 36.5–49.3)
HGB BLD-MCNC: 13.3 G/DL (ref 12–17)
HGB UR QL STRIP.AUTO: NEGATIVE
IMM GRANULOCYTES # BLD AUTO: 0.01 THOUSAND/UL (ref 0–0.2)
IMM GRANULOCYTES NFR BLD AUTO: 0 % (ref 0–2)
KETONES UR STRIP-MCNC: NEGATIVE MG/DL
LEUKOCYTE ESTERASE UR QL STRIP: NEGATIVE
LIPASE SERPL-CCNC: 66 U/L (ref 11–82)
LYMPHOCYTES # BLD AUTO: 1.91 THOUSANDS/ÂΜL (ref 0.6–4.47)
LYMPHOCYTES NFR BLD AUTO: 39 % (ref 14–44)
MCH RBC QN AUTO: 30.1 PG (ref 26.8–34.3)
MCHC RBC AUTO-ENTMCNC: 33.8 G/DL (ref 31.4–37.4)
MCV RBC AUTO: 89 FL (ref 82–98)
MONOCYTES # BLD AUTO: 0.55 THOUSAND/ÂΜL (ref 0.17–1.22)
MONOCYTES NFR BLD AUTO: 11 % (ref 4–12)
NEUTROPHILS # BLD AUTO: 2.21 THOUSANDS/ÂΜL (ref 1.85–7.62)
NEUTS SEG NFR BLD AUTO: 45 % (ref 43–75)
NITRITE UR QL STRIP: NEGATIVE
NRBC BLD AUTO-RTO: 0 /100 WBCS
PH UR STRIP.AUTO: 7 [PH] (ref 4.5–8)
PLATELET # BLD AUTO: 241 THOUSANDS/UL (ref 149–390)
PMV BLD AUTO: 8.4 FL (ref 8.9–12.7)
POTASSIUM SERPL-SCNC: 4 MMOL/L (ref 3.5–5.3)
PROT SERPL-MCNC: 7.3 G/DL (ref 6.4–8.4)
PROT UR STRIP-MCNC: NEGATIVE MG/DL
RBC # BLD AUTO: 4.42 MILLION/UL (ref 3.88–5.62)
SODIUM SERPL-SCNC: 131 MMOL/L (ref 135–147)
SP GR UR STRIP.AUTO: 1.01 (ref 1–1.03)
UROBILINOGEN UR QL STRIP.AUTO: 0.2 E.U./DL
WBC # BLD AUTO: 4.93 THOUSAND/UL (ref 4.31–10.16)

## 2025-07-04 PROCEDURE — 36415 COLL VENOUS BLD VENIPUNCTURE: CPT | Performed by: PHYSICIAN ASSISTANT

## 2025-07-04 PROCEDURE — 81003 URINALYSIS AUTO W/O SCOPE: CPT

## 2025-07-04 PROCEDURE — 71101 X-RAY EXAM UNILAT RIBS/CHEST: CPT

## 2025-07-04 PROCEDURE — 99284 EMERGENCY DEPT VISIT MOD MDM: CPT

## 2025-07-04 PROCEDURE — 84484 ASSAY OF TROPONIN QUANT: CPT | Performed by: PHYSICIAN ASSISTANT

## 2025-07-04 PROCEDURE — NC001 PR NO CHARGE: Performed by: PHYSICIAN ASSISTANT

## 2025-07-04 PROCEDURE — 93005 ELECTROCARDIOGRAM TRACING: CPT

## 2025-07-04 PROCEDURE — 96374 THER/PROPH/DIAG INJ IV PUSH: CPT

## 2025-07-04 PROCEDURE — 99285 EMERGENCY DEPT VISIT HI MDM: CPT | Performed by: PHYSICIAN ASSISTANT

## 2025-07-04 PROCEDURE — 96361 HYDRATE IV INFUSION ADD-ON: CPT

## 2025-07-04 PROCEDURE — 74177 CT ABD & PELVIS W/CONTRAST: CPT

## 2025-07-04 PROCEDURE — 85025 COMPLETE CBC W/AUTO DIFF WBC: CPT | Performed by: PHYSICIAN ASSISTANT

## 2025-07-04 PROCEDURE — 80053 COMPREHEN METABOLIC PANEL: CPT | Performed by: PHYSICIAN ASSISTANT

## 2025-07-04 PROCEDURE — 83690 ASSAY OF LIPASE: CPT | Performed by: PHYSICIAN ASSISTANT

## 2025-07-04 RX ORDER — NAPROXEN 500 MG/1
500 TABLET ORAL 2 TIMES DAILY WITH MEALS
Qty: 30 TABLET | Refills: 0 | Status: SHIPPED | OUTPATIENT
Start: 2025-07-04

## 2025-07-04 RX ORDER — KETOROLAC TROMETHAMINE 30 MG/ML
15 INJECTION, SOLUTION INTRAMUSCULAR; INTRAVENOUS ONCE
Status: COMPLETED | OUTPATIENT
Start: 2025-07-04 | End: 2025-07-04

## 2025-07-04 RX ADMIN — KETOROLAC TROMETHAMINE 15 MG: 30 INJECTION, SOLUTION INTRAMUSCULAR; INTRAVENOUS at 18:22

## 2025-07-04 RX ADMIN — SODIUM CHLORIDE 1000 ML: 0.9 INJECTION, SOLUTION INTRAVENOUS at 20:08

## 2025-07-04 RX ADMIN — IOHEXOL 100 ML: 350 INJECTION, SOLUTION INTRAVENOUS at 19:27
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
## 2025-07-05 LAB
ATRIAL RATE: 61 BPM
P AXIS: 46 DEGREES
PR INTERVAL: 156 MS
QRS AXIS: 64 DEGREES
QRSD INTERVAL: 120 MS
QT INTERVAL: 428 MS
QTC INTERVAL: 430 MS
T WAVE AXIS: 50 DEGREES
VENTRICULAR RATE: 61 BPM

## 2025-07-05 PROCEDURE — 93010 ELECTROCARDIOGRAM REPORT: CPT | Performed by: INTERNAL MEDICINE

## 2025-07-05 NOTE — ED PROVIDER NOTES
Emergency Department Sign Out Note        [facility] See [facility].     The patient, Jose Wesley, was evaluated by the previous provider for R rib pain.    Workup Completed:  Labs Reviewed   CBC AND DIFFERENTIAL - Abnormal       Result Value Ref Range Status    WBC 4.93  4.31 - 10.16 Thousand/uL Final    RBC 4.42  3.88 - 5.62 Million/uL Final    Hemoglobin 13.3  12.0 - 17.0 g/dL Final    Hematocrit 39.3  36.5 - 49.3 % Final    MCV 89  82 - 98 fL Final    MCH 30.1  26.8 - 34.3 pg Final    MCHC 33.8  31.4 - 37.4 g/dL Final    RDW 12.3  11.6 - 15.1 % Final    MPV 8.4 (*) 8.9 - 12.7 fL Final    Platelets 241  149 - 390 Thousands/uL Final    nRBC 0  /100 WBCs Final    Segmented % 45  43 - 75 % Final    Immature Grans % 0  0 - 2 % Final    Lymphocytes % 39  14 - 44 % Final    Monocytes % 11  4 - 12 % Final    Eosinophils Relative 4  0 - 6 % Final    Basophils Relative 1  0 - 1 % Final    Absolute Neutrophils 2.21  1.85 - 7.62 Thousands/µL Final    Absolute Immature Grans 0.01  0.00 - 0.20 Thousand/uL Final    Absolute Lymphocytes 1.91  0.60 - 4.47 Thousands/µL Final    Absolute Monocytes 0.55  0.17 - 1.22 Thousand/µL Final    Eosinophils Absolute 0.21  0.00 - 0.61 Thousand/µL Final    Basophils Absolute 0.04  0.00 - 0.10 Thousands/µL Final   COMPREHENSIVE METABOLIC PANEL - Abnormal    Sodium 131 (*) 135 - 147 mmol/L Final    Potassium 4.0  3.5 - 5.3 mmol/L Final    Chloride 100  96 - 108 mmol/L Final    CO2 28  21 - 32 mmol/L Final    ANION GAP 3 (*) 4 - 13 mmol/L Final    BUN 11  5 - 25 mg/dL Final    Creatinine 0.74  0.60 - 1.30 mg/dL Final    Comment: Standardized to IDMS reference method    Glucose 92  65 - 140 mg/dL Final    Comment: If the patient is fasting, the ADA then defines impaired fasting glucose as > 100 mg/dL and diabetes as > or equal to 123 mg/dL.    Calcium 9.0  8.4 - 10.2 mg/dL Final    AST 19  13 - 39 U/L Final    ALT 25  7 - 52 U/L  "Final    Comment: Specimen collection should occur prior to Sulfasalazine administration due to the potential for falsely depressed results.     Alkaline Phosphatase 76  34 - 104 U/L Final    Total Protein 7.3  6.4 - 8.4 g/dL Final    Albumin 4.4  3.5 - 5.0 g/dL Final    Total Bilirubin 0.62  0.20 - 1.00 mg/dL Final    Comment: Use of this assay is not recommended for patients undergoing treatment with eltrombopag due to the potential for falsely elevated results.  N-acetyl-p-benzoquinone imine (metabolite of Acetaminophen) will generate erroneously low results in samples for patients that have taken an overdose of Acetaminophen.    eGFR 96  ml/min/1.73sq m Final    Narrative:     National Kidney Disease Foundation guidelines for Chronic Kidney Disease (CKD):     Stage 1 with normal or high GFR (GFR > 90 mL/min/1.73 square meters)    Stage 2 Mild CKD (GFR = 60-89 mL/min/1.73 square meters)    Stage 3A Moderate CKD (GFR = 45-59 mL/min/1.73 square meters)    Stage 3B Moderate CKD (GFR = 30-44 mL/min/1.73 square meters)    Stage 4 Severe CKD (GFR = 15-29 mL/min/1.73 square meters)    Stage 5 End Stage CKD (GFR <15 mL/min/1.73 square meters)  Note: GFR calculation is accurate only with a steady state creatinine   LIPASE - Normal    Lipase 66  11 - 82 u/L Final   HS TROPONIN I 0HR - Normal    hs TnI 0hr 4  \"Refer to ACS Flowchart\"- see link ng/L Final    Comment:                                              Initial (time 0) result  If >=50 ng/L, Myocardial injury suggested ;  Type of myocardial injury and treatment strategy  to be determined.  If 5-49 ng/L, a delta result at 2 hours will be needed to further evaluate.  If <4 ng/L, and chest pain has been >3 hours since onset, patient may qualify for discharge based on the HEART score in the ED.  If <5 ng/L and <3hours since onset of chest pain, a delta result at 2 hours will be needed to further evaluate.    HS Troponin 99th Percentile URL of a Health Population=12 ng/L " with a 95% Confidence Interval of 8-18 ng/L.    Second Troponin (time 2 hours)  If calculated delta >= 20 ng/L,  Myocardial injury suggested ; Type of myocardial injury and treatment strategy to be determined.  If 5-49 ng/L and the calculated delta is 5-19 ng/L, consult medical service for evaluation.  Continue evaluation for ischemia on ecg and other possible etiology and repeat hs troponin at 4 hours.  If delta is <5 ng/L at 2 hours, consider discharge based on risk stratification via the HEART score (if in ED), or JACI risk score in IP/Observation.    HS Troponin 99th Percentile URL of a Health Population=12 ng/L with a 95% Confidence Interval of 8-18 ng/L.   URINE MACROSCOPIC, POC    Color, UA Yellow   Final    Clarity, UA Clear   Final    pH, UA 7.0  4.5 - 8.0 Final    Leukocytes, UA Negative  Negative Final    Nitrite, UA Negative  Negative Final    Protein, UA Negative  Negative mg/dl Final    Glucose, UA Negative  Negative mg/dl Final    Ketones, UA Negative  Negative mg/dl Final    Urobilinogen, UA 0.2  0.2, 1.0 E.U./dl E.U./dl Final    Bilirubin, UA Negative  Negative Final    Occult Blood, UA Negative  Negative Final    Specific Gravity, UA 1.010  1.003 - 1.030 Final    Narrative:     CLINITEK RESULT     CT abdomen pelvis with contrast   Final Result      Cholelithiasis without CT evidence for acute cholecystitis.      Bladder wall thickening likely on the basis of underdistention. Correlate with urinalysis to exclude cystitis.      Prostatomegaly.         Workstation performed: [id number]         XR ribs with pa chest min 3 views RIGHT   Final Result      No acute pulmonary pathology.      No evidence of right rib fractures.            Workstation performed: [id number]               ED Course / Workup Pending (followup):  -CT does show gallstone, no evidence of gallbladder thickening -on reexam he does not have upper upper quadrant tenderness and localizes more towards lateral/posterior ribs.  Favor  MSK      HEART Risk Score      Flowsheet Row Most Recent Value   Heart Score Risk Calculator    History 0 Filed at: 07/04/2025 1957   ECG 0 Filed at: 07/04/2025 1957   Age 1 Filed at: 07/04/2025 1957   Risk Factors 0 Filed at: 07/04/2025 1957   Troponin 0 Filed at: 07/04/2025 1957   HEART Score 1 Filed at: 07/04/2025 1957          No data recorded                             Procedures  Medical Decision Making  Right-sided rib/chest pain, intermittently over the past week.  CT of abdomen/pelvis does show gallstone although no evidence of cholecystitis.  On reexam he does not have any right upper quadrant tenderness and fever MSK etiology/costochondritis.  Probable under distention of bladder on CT, no UTI by dip.  Follow-up with PCP recommended.  Return indications reviewed.    Amount and/or Complexity of Data Reviewed  Labs: ordered.  Radiology: ordered.    Risk  Prescription drug management.            Disposition  Final diagnoses:   Rib pain on right side     Time reflects when diagnosis was documented in both MDM as applicable and the Disposition within this note       Time User Action Codes Description Comment    7/4/2025  6:53 PM Luis Jim Add [R07.81] Rib pain on right side           ED Disposition       ED Disposition   Discharge    Condition   Stable    Date/Time   Fri Jul 4, 2025 1853    Comment   Jose Wesley discharge to home/self care.                   Follow-up Information       Follow up With Specialties Details Why Contact Info Additional Information    Levar Graves,  Family Medicine Schedule an appointment as soon as possible for a visit in 1 day  [address] [place] [id number] [phone]       [facility] Emergency Department Emergency Medicine  If symptoms worsen [address] [facility] [id number]  [phone] [facility] Emergency Department, [address] [place], [place], [address]          Discharge Medication List as of 7/4/2025   9:23 PM        START taking these medications    Details   naproxen (Naprosyn) 500 mg tablet Take 1 tablet (500 mg total) by mouth 2 (two) times a day with meals, Starting Fri 7/4/2025, Normal           CONTINUE these medications which have NOT CHANGED    Details   Acetaminophen (TYLENOL PO) Take 500 mg by mouth daily as needed, Historical Med      albuterol (PROVENTIL HFA,VENTOLIN HFA) 90 mcg/act inhaler Historical Med      Cyanocobalamin 1000 MCG SUBL Place 1 tablet (1,000 mcg total) under the tongue daily, Starting u 4/3/2025, Normal      doxazosin (CARDURA) 2 mg tablet Take 2 mg by mouth daily at bedtime, Historical Med      fluticasone (FLONASE) 50 mcg/act nasal spray Historical Med      ibuprofen (MOTRIN) 800 mg tablet Historical Med      Lidocaine Viscous HCl (XYLOCAINE) 2 % mucosal solution Historical Med      methylPREDNISolone 4 MG tablet therapy pack Use as directed on package, Normal      omeprazole (PriLOSEC) 20 mg delayed release capsule Take 20 mg by mouth daily, Historical Med      pantoprazole (PROTONIX) 40 mg tablet Historical Med      triamcinolone (KENALOG) 0.1 % cream Historical Med           No discharge procedures on file.       ED Provider  Electronically Signed by     Antonio Hart PA-C  07/04/25 1817

## 2025-07-09 ENCOUNTER — OFFICE VISIT (OUTPATIENT)
Dept: OBGYN CLINIC | Facility: MEDICAL CENTER | Age: 65
End: 2025-07-09
Payer: COMMERCIAL

## 2025-07-09 VITALS — BODY MASS INDEX: 23.46 KG/M2 | WEIGHT: 146 LBS | HEIGHT: 66 IN

## 2025-07-09 DIAGNOSIS — M47.812 CERVICAL SPONDYLOSIS: ICD-10-CM

## 2025-07-09 DIAGNOSIS — M77.12 LATERAL EPICONDYLITIS OF LEFT ELBOW: ICD-10-CM

## 2025-07-09 DIAGNOSIS — M25.512 CHRONIC LEFT SHOULDER PAIN: Primary | ICD-10-CM

## 2025-07-09 DIAGNOSIS — G89.29 CHRONIC LEFT SHOULDER PAIN: Primary | ICD-10-CM

## 2025-07-09 DIAGNOSIS — M75.82 TENDINITIS OF LEFT ROTATOR CUFF: ICD-10-CM
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
## 2025-07-09 DIAGNOSIS — S43.432D TEAR OF LEFT GLENOID LABRUM, SUBSEQUENT ENCOUNTER: ICD-10-CM

## 2025-07-09 DIAGNOSIS — G89.29 CHRONIC ELBOW PAIN, LEFT: ICD-10-CM

## 2025-07-09 DIAGNOSIS — M25.522 CHRONIC ELBOW PAIN, LEFT: ICD-10-CM

## 2025-07-09 DIAGNOSIS — M19.012 PRIMARY OSTEOARTHRITIS OF LEFT SHOULDER: ICD-10-CM

## 2025-07-09 PROCEDURE — 99214 OFFICE O/P EST MOD 30 MIN: CPT | Performed by: EMERGENCY MEDICINE

## 2025-07-09 PROCEDURE — 20610 DRAIN/INJ JOINT/BURSA W/O US: CPT | Performed by: EMERGENCY MEDICINE

## 2025-07-09 RX ORDER — ROPIVACAINE HYDROCHLORIDE 2 MG/ML
2 INJECTION, SOLUTION EPIDURAL; INFILTRATION; PERINEURAL
Status: COMPLETED | OUTPATIENT
Start: 2025-07-09 | End: 2025-07-09

## 2025-07-09 RX ORDER — METHYLPREDNISOLONE ACETATE 40 MG/ML
1 INJECTION, SUSPENSION INTRA-ARTICULAR; INTRALESIONAL; INTRAMUSCULAR; SOFT TISSUE
Status: COMPLETED | OUTPATIENT
Start: 2025-07-09 | End: 2025-07-09

## 2025-07-09 RX ADMIN — ROPIVACAINE HYDROCHLORIDE 2 ML: 2 INJECTION, SOLUTION EPIDURAL; INFILTRATION; PERINEURAL at 13:45

## 2025-07-09 RX ADMIN — METHYLPREDNISOLONE ACETATE 1 ML: 40 INJECTION, SUSPENSION INTRA-ARTICULAR; INTRALESIONAL; INTRAMUSCULAR; SOFT TISSUE at 13:45

## 2025-07-09 NOTE — ASSESSMENT & PLAN NOTE
Symptoms consistent with lateral epicondylitis, reviewed MRI of the left elbow  Recommend formal directed physical therapy, offered tennis elbow strap  Discussed risks and benefits of CSI

## 2025-07-09 NOTE — ASSESSMENT & PLAN NOTE
Jose experienced H/A and N/T with Medrol pack, so we discussed risks of CSI which he requested today.  If no improvement t/c FL guided GHJ CSI for labral tear and cyst, however I do not feel there is any need to refer to Sports surgeon at this time.  Reviewed MRI Left shoulder and elbow  Will start physical therapy        Orders:    Large joint arthrocentesis: L subacromial bursa
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
## 2025-07-09 NOTE — PATIENT INSTRUCTIONS
Call us in 2 weeks if there is no improvement of your left shoulder pain with the steroid injection.      Patient Education     Ejercicios para el desgarro del manguito rotador   Acerca de gloria hien   Un desgarro del manguito rotador es un problema que puede limitar cuánto puede  el hombro. También puede ser doloroso. El manguito rotador es un efraín de músculos y tendones que se encuentran en el hombro. Ayuda a que el hombro se mueva y esté estable. Estos músculos ayudan a rotar y elevar el brazo. Un tendón es raz dewitt sathish de tejido que conecta los músculos a los huesos. Puede desgarrarse un tendón en el hombro si se  o mueve el hombro demasiado rápido y con demasiada fuerza. Shepard tendón también puede desgastarse con el tiempo y desgarrarse. Los grandes desgarros pueden requerir cirugía. Para los desgarros pequeños, los ejercicios pueden ayudar a mejorar gloria problema.  General   Antes de comenzar con un programa, consulte a shepard médico para saber si está lo suficientemente tim norma para hacer estos ejercicios. Es posible que el médico le pida que trabaje con un entrenador o fisioterapeuta para elaborar un programa seguro de actividad física que cumpla con janell necesidades. No debe hacer los ejercicios si causan flower agudos. Es posible que deba comenzar con ejercicios fáciles al principio. Luego, raz vez que shepard hombro se sienta mejor, podrá comenzar con los ejercicios más difíciles.  Ejercicios pasivos:   Usted utiliza el movimiento de shepard cuerpo para  el brazo. NO utilice los músculos de los hombros cuando mueva los brazos.  Ejercicios en péndulo: sosténgase de raz magallon con raz mano y flexione el cuerpo hacia adelante a la altura de la cintura hasta que la espalda quede nivelada con la magallon. Deje que el brazo adolorido cuelgue frente a usted. Esther cada ejercicio lorrie 1 minuto.  Círculos: mueva el cuerpo en grandes círculos hacia raz dirección. Después de hacerlo varias veces, el brazo debe  comenzar a moverse suavemente en círculos. Ahora, mueva el cuerpo en círculos hacia la otra dirección hasta que el brazo se mueva hacia el otro lado.  Movimiento de lado a lado: mueva el cuerpo de lado a lado. Después de moverse varias veces, el brazo debe comenzar a moverse suavemente de lado a lado.  Movimiento hacia adelante y atrás: mueva el cuerpo hacia adelante y luego hacia atrás. Después de hacerlo varias veces, el brazo debe comenzar a moverse suavemente hacia adelante y hacia atrás.  Ejercicios de estiramiento   Los ejercicios de estiramiento mantienen los músculos flexibles. También evitan que se endurezcan. Para comenzar, realice estos estiramientos 2 o 3 veces. Para que shepard cuerpo produzca cambios, deberá sostener estos estiramientos lorrie 20 a 30 segundos. Realice estos estiramientos 2 o 3 veces al día. Esther todos los ejercicios de forma lenta.  Ejercicios de fortalecimiento   Los ejercicios de fortalecimiento mantienen los músculos firmes y emilee. Comience repitiendo cada ejercicio 2 o 3 veces. Progrese hasta hacer cada ejercicio 10 veces. Trate de hacer estos ejercicios 2 o 3 veces al día. Esther todos los ejercicios de forma lenta.  Ejercicios para omóplatos: apriete los omóplatos en la parte superior de la espalda y mantenga de 3 a 5 segundos. Relaje.  Ejercicios para la rehabilitación con bastón:  Flexiones sobre la jessi: recuéstese y tome el bastón con ambas tati. Comience con los brazos rectos y el bastón sobre la parte superior de las piernas. Mantenga los brazos extendidos y eleve el bastón por encima de la jessi.  Abducciones o movimiento de lado a lado: párese y tome el bastón con las dos tati. Gire los pulgares hacia la izquierda para estirar el hombro nitesh. Comience con los brazos rectos y el bastón sobre la parte superior de las piernas. Empuje el bastón hacia la izquierda, elevando el brazo nitesh. Mantenga el codo nitesh recto. Siga empujando hasta lograr raz buena  elongación. Cambie la posición de las tati para que los pulgares apunten hacia la derecha y empuje el bastón a la derecha para estirar el hombro derecho.  Rotaciones externas o movimiento de lado a lado: recuéstese y tome el bastón con las dos tati. Comience con los codos flexionados y en contacto con el cuerpo. Coloque la punta del bastón en la mayer de la mano derecha y estire el hombro derecho. Farber el otro extremo del bastón con la mano izquierda. Empuje el bastón al costado hacia la mayer derecha hasta que sienta la elongación en el hombro. Asegúrese de mantener el codo derecho cerca del cuerpo mientras estira. Cambie de mano para estirar el hombro nitesh.  Rotaciones internas: párese y tome el bastón con ambas tati detrás de la espalda. Con las bruna hacia atrás, deslice el bastón por la espalda hacia arriba. Siga hasta que sienta raz buena elongación en la parte frontal del hombro.  Ejercicios para omóplatos: recuéstese boca abajo cerca del borde de raz alfombra, cama o sobre raz pelota para ejercicios. Comience con los brazos colgando en raz posición relajada.  Posición Y: eleve los brazos hacia arriba y hacia los lados, de modo que los codos estén cerca de las orejas y los brazos rectos en diagonal norma la letra Y. Baje los brazos hasta la posición inicial.  Posición T: eleve los brazos en forma recta hacia los lados, a la altura de los hombros. Baje los brazos hasta la posición inicial.  Posición W: eleve los brazos hacia arriba y hacia los lados con los codos flexionados. Las tati deben estar felicity encima de los hombros y formar la letra W vista desde arriba. Baje los brazos hasta la posición inicial.  Posición L: mantenga los codos a los lados y eleve solamente los antebrazos hacia afuera para formar raz letra L y raz letra L inversa. Baje los brazos hasta la posición inicial.  Ejercicios para hombros: ate un nudo en raz dewitt elástica. Asegure la dewitt en raz larisa y ciérrela a la altura de la  "cintura. Envuelva la dewitt alrededor de raz mano para tener un buen agarre. Aléjese de la larisa los suficiente norma para tener raz leve tensión en la dewitt. A medida que el ejercicio se hace más fácil, puede cambiar a raz dewitt más resistente. Acercarse o alejarse del punto en el que está atada la dewitt disminuirá o aumentará la tensión en la dewitt.  Rotaciones internas: colóquese de lado con el brazo que sostiene la dewitt más cerca de la larisa. Doble el codo a 90 grados o en forma de \"L\". Manteniendo el codo flexionado a shepard lado, tire de la dewitt sobre el cuerpo. Llévela nuevamente a la posición inicial. Repita con el otro brazo.  Rotaciones externas: colóquese de lado con el brazo que sostiene la dewitt más alejada de la larisa. Doble el codo a 90 grados o en forma de \"L\". Manteniendo el codo flexionado a shepard lado, tire de la dewitt lejos del cuerpo. Llévela nuevamente a la posición inicial. Repita con el otro brazo.  Abducciones: colóquese de lado con el brazo que sostiene la edwitt más alejado de la larisa. Asegúrese de que el pulgar ashley hacia arriba. Con el codo recto, tire de la dewitt hacia afuera y aléjela del cuerpo. Llegue hasta el nivel del hombro. Llévela nuevamente a la posición inicial. Repita con el otro brazo.  Flexiones: colóquese de espaldas a la larisa. Con el codo recto, tire de la dewitt en forma recta frente a usted. Llévela nuevamente a la posición inicial. Repita con el otro brazo.               ¿Cuáles serán los resultados?   Disminuir el dolor  Más fuerza  Puede  el brazo con mayor jeff  Resulta más fácil hacer las actividades diarias  El hombro es más estable  Evitar raz nueva lesión  Consejos útiles   Manténgase activo y realice ejercicios para mantener los músculos emilee y flexibles.  Siga raz dieta saludable para mantener los músculos sanos.  No contenga la respiración cuando realice la actividad física. Dragoon puede aumentar la presión arterial. Si tiende a aguantar la " respiración, pruebe contar en voz karlie mientras hace ejercicio. Si algún ejercicio le genera malestar, deje de hacerlo inmediatamente.  Siempre caliente antes del estiramiento. Los músculos calientes se estiran más fácil que los fríos. Estirar músculos fríos puede causar lesiones.  Intente balancear los brazos a un ritmo lento lorrie algunos minutos para calentar los músculos. Esther esto de nuevo luego de ejercitar.  No rebote cuando se estire.  Después de hacer ejercicio, es buena idea usar hielo. Coloque raz compresa de hielo o raz bolsa de guisantes congelados envuelta en raz toalla sobre la parte del cuerpo que le duela. Nunca coloque el hielo directamente sobre la piel. No se deje el hielo lorrie más de 10 a 15 minutos por vez. Colocar hielo después de la actividad física puede disminuir el dolor y la hinchazón. Nunca coloque hielo antes de estirarse.  Ejercitarse antes de la comida es raz buena manera de adoptar raz rutina.  Es posible que se sienta algo incómodo cuando esther ejercicio, lory no debería sentir un dolor intenso. Si siente flower agudos, deje lo que está haciendo. Si el dolor erick continúa, llame al médico.  Exención de responsabilidad y uso de la información del consumidor   Esta información general es un resumen limitado de la información sobre el diagnóstico, el tratamiento y/o la medicación. No pretende ser exhaustivo y debe utilizarse norma raz herramienta para ayudar al usuario a comprender y/o evaluar las posibles opciones de diagnóstico y tratamiento. NO incluye toda la información sobre las enfermedades, los tratamientos, los medicamentos, los efectos secundarios o los riesgos que pueden aplicarse a un paciente específico. No tiene por objeto ser un consejo médico ni un sustituto del consejo médico. Tampoco pretende reemplazar al diagnóstico o el tratamiento proporcionados por un proveedor de atención médica con base en el examen y la evaluación por parte de gloria proveedor de las  circunstancias específicas y únicas de un paciente. Los pacientes deben hablar con un proveedor de atención médica para obtener información completa sobre shepard ronit, preguntas médicas y opciones de tratamiento, incluidos los riesgos o beneficios relacionados con el uso de medicamentos. Esta información no respalda ningún tratamiento o medicamento norma seguro, eficaz o aprobado para tratar a un paciente específico. [employer] y janell afiliados renuncian a cualquier garantía o responsabilidad relacionada con esta información o con el uso que se liliana de esta. El uso de esta información se rige por las Condiciones de uso, disponibles en https://www.wolterskluwer.com/en/know/clinical-effectiveness-terms   Copyright   © 2024 [employer] Todos los derechos reservados.

## 2025-07-16 ENCOUNTER — TELEPHONE (OUTPATIENT)
Dept: NEUROLOGY | Facility: CLINIC | Age: 65
End: 2025-07-16
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
## 2025-07-23 ENCOUNTER — CONSULT (OUTPATIENT)
Dept: PAIN MEDICINE | Facility: MEDICAL CENTER | Age: 65
End: 2025-07-23
Payer: COMMERCIAL

## 2025-07-23 VITALS — WEIGHT: 142 LBS | HEIGHT: 66 IN | BODY MASS INDEX: 22.82 KG/M2

## 2025-07-23 DIAGNOSIS — M47.812 CERVICAL SPONDYLOSIS: ICD-10-CM
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
## 2025-07-23 DIAGNOSIS — M54.12 CERVICAL RADICULITIS: Primary | ICD-10-CM

## 2025-07-23 PROCEDURE — 99204 OFFICE O/P NEW MOD 45 MIN: CPT | Performed by: PHYSICAL MEDICINE & REHABILITATION
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
## 2025-08-14 ENCOUNTER — NURSE TRIAGE (OUTPATIENT)
Dept: OTHER | Facility: OTHER | Age: 65
End: 2025-08-14

## 2025-08-15 ENCOUNTER — HOSPITAL ENCOUNTER (OUTPATIENT)
Dept: RADIOLOGY | Facility: MEDICAL CENTER | Age: 65
Discharge: HOME | End: 2025-08-15
Attending: PHYSICAL MEDICINE & REHABILITATION
Payer: COMMERCIAL

## 2025-08-22 VITALS — WEIGHT: 147 LBS | BODY MASS INDEX: 23.63 KG/M2 | HEIGHT: 66 IN
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
## 2025-08-22 DIAGNOSIS — G89.29 CHRONIC ELBOW PAIN, LEFT: ICD-10-CM

## 2025-08-22 DIAGNOSIS — S43.432D TEAR OF LEFT GLENOID LABRUM, SUBSEQUENT ENCOUNTER: ICD-10-CM

## 2025-08-22 DIAGNOSIS — M47.812 CERVICAL SPONDYLOSIS: ICD-10-CM

## 2025-08-22 DIAGNOSIS — G89.29 CHRONIC LEFT SHOULDER PAIN: Primary | ICD-10-CM

## 2025-08-22 DIAGNOSIS — M25.512 CHRONIC LEFT SHOULDER PAIN: Primary | ICD-10-CM

## 2025-08-22 DIAGNOSIS — M77.12 LATERAL EPICONDYLITIS OF LEFT ELBOW: ICD-10-CM

## 2025-08-22 DIAGNOSIS — M19.012 PRIMARY OSTEOARTHRITIS OF LEFT SHOULDER: ICD-10-CM

## 2025-08-22 DIAGNOSIS — M75.82 TENDINITIS OF LEFT ROTATOR CUFF: ICD-10-CM

## 2025-08-22 DIAGNOSIS — M25.522 CHRONIC ELBOW PAIN, LEFT: ICD-10-CM

## 2025-08-22 PROCEDURE — 99213 OFFICE O/P EST LOW 20 MIN: CPT | Performed by: EMERGENCY MEDICINE
